# Patient Record
Sex: FEMALE | Race: WHITE | NOT HISPANIC OR LATINO | URBAN - METROPOLITAN AREA
[De-identification: names, ages, dates, MRNs, and addresses within clinical notes are randomized per-mention and may not be internally consistent; named-entity substitution may affect disease eponyms.]

---

## 2017-01-01 ENCOUNTER — INPATIENT (INPATIENT)
Facility: HOSPITAL | Age: 82
LOS: 17 days | DRG: 435 | End: 2017-09-19
Attending: INTERNAL MEDICINE | Admitting: INTERNAL MEDICINE
Payer: COMMERCIAL

## 2017-01-01 VITALS — OXYGEN SATURATION: 89 % | HEART RATE: 83 BPM | RESPIRATION RATE: 16 BRPM | TEMPERATURE: 99 F

## 2017-01-01 VITALS — SYSTOLIC BLOOD PRESSURE: 120 MMHG | RESPIRATION RATE: 18 BRPM | DIASTOLIC BLOOD PRESSURE: 72 MMHG | HEART RATE: 95 BPM

## 2017-01-01 DIAGNOSIS — K86.9 DISEASE OF PANCREAS, UNSPECIFIED: ICD-10-CM

## 2017-01-01 DIAGNOSIS — R53.83 OTHER FATIGUE: ICD-10-CM

## 2017-01-01 DIAGNOSIS — K83.1 OBSTRUCTION OF BILE DUCT: ICD-10-CM

## 2017-01-01 DIAGNOSIS — K80.50 CALCULUS OF BILE DUCT WITHOUT CHOLANGITIS OR CHOLECYSTITIS WITHOUT OBSTRUCTION: ICD-10-CM

## 2017-01-01 DIAGNOSIS — R45.1 RESTLESSNESS AND AGITATION: ICD-10-CM

## 2017-01-01 DIAGNOSIS — I10 ESSENTIAL (PRIMARY) HYPERTENSION: ICD-10-CM

## 2017-01-01 DIAGNOSIS — Z29.9 ENCOUNTER FOR PROPHYLACTIC MEASURES, UNSPECIFIED: ICD-10-CM

## 2017-01-01 DIAGNOSIS — Z71.89 OTHER SPECIFIED COUNSELING: ICD-10-CM

## 2017-01-01 DIAGNOSIS — Z51.5 ENCOUNTER FOR PALLIATIVE CARE: ICD-10-CM

## 2017-01-01 DIAGNOSIS — R52 PAIN, UNSPECIFIED: ICD-10-CM

## 2017-01-01 DIAGNOSIS — J44.9 CHRONIC OBSTRUCTIVE PULMONARY DISEASE, UNSPECIFIED: ICD-10-CM

## 2017-01-01 DIAGNOSIS — E03.9 HYPOTHYROIDISM, UNSPECIFIED: ICD-10-CM

## 2017-01-01 DIAGNOSIS — M79.669 PAIN IN UNSPECIFIED LOWER LEG: ICD-10-CM

## 2017-01-01 DIAGNOSIS — R53.81 OTHER MALAISE: ICD-10-CM

## 2017-01-01 DIAGNOSIS — N83.9 NONINFLAMMATORY DISORDER OF OVARY, FALLOPIAN TUBE AND BROAD LIGAMENT, UNSPECIFIED: ICD-10-CM

## 2017-01-01 DIAGNOSIS — Z66 DO NOT RESUSCITATE: ICD-10-CM

## 2017-01-01 DIAGNOSIS — C24.1 MALIGNANT NEOPLASM OF AMPULLA OF VATER: ICD-10-CM

## 2017-01-01 DIAGNOSIS — F31.9 BIPOLAR DISORDER, UNSPECIFIED: ICD-10-CM

## 2017-01-01 DIAGNOSIS — N32.89 OTHER SPECIFIED DISORDERS OF BLADDER: ICD-10-CM

## 2017-01-01 LAB
ALBUMIN SERPL ELPH-MCNC: 2.1 G/DL — LOW (ref 3.3–5)
ALBUMIN SERPL ELPH-MCNC: 2.3 G/DL — LOW (ref 3.3–5)
ALBUMIN SERPL ELPH-MCNC: 2.7 G/DL — LOW (ref 3.3–5)
ALBUMIN SERPL ELPH-MCNC: 3 G/DL — LOW (ref 3.3–5)
ALBUMIN SERPL ELPH-MCNC: 3.1 G/DL — LOW (ref 3.3–5)
ALP SERPL-CCNC: 588 U/L — HIGH (ref 40–120)
ALP SERPL-CCNC: 621 U/L — HIGH (ref 40–120)
ALP SERPL-CCNC: 626 U/L — HIGH (ref 40–120)
ALP SERPL-CCNC: 705 U/L — HIGH (ref 40–120)
ALP SERPL-CCNC: 830 U/L — HIGH (ref 40–120)
ALT FLD-CCNC: 143 U/L RC — HIGH (ref 10–45)
ALT FLD-CCNC: 149 U/L RC — HIGH (ref 10–45)
ALT FLD-CCNC: 177 U/L RC — HIGH (ref 10–45)
ALT FLD-CCNC: 239 U/L RC — HIGH (ref 10–45)
ALT FLD-CCNC: 250 U/L RC — HIGH (ref 10–45)
ANION GAP SERPL CALC-SCNC: 12 MMOL/L — SIGNIFICANT CHANGE UP (ref 5–17)
ANION GAP SERPL CALC-SCNC: 12 MMOL/L — SIGNIFICANT CHANGE UP (ref 5–17)
ANION GAP SERPL CALC-SCNC: 7 MMOL/L — SIGNIFICANT CHANGE UP (ref 5–17)
ANION GAP SERPL CALC-SCNC: 8 MMOL/L — SIGNIFICANT CHANGE UP (ref 5–17)
ANION GAP SERPL CALC-SCNC: 9 MMOL/L — SIGNIFICANT CHANGE UP (ref 5–17)
APPEARANCE UR: ABNORMAL
APPEARANCE UR: ABNORMAL
APTT BLD: 30.8 SEC — SIGNIFICANT CHANGE UP (ref 27.5–37.4)
AST SERPL-CCNC: 169 U/L — HIGH (ref 10–40)
AST SERPL-CCNC: 179 U/L — HIGH (ref 10–40)
AST SERPL-CCNC: 179 U/L — HIGH (ref 10–40)
AST SERPL-CCNC: 291 U/L — HIGH (ref 10–40)
AST SERPL-CCNC: 334 U/L — HIGH (ref 10–40)
BASE EXCESS BLDV CALC-SCNC: 9.9 MMOL/L — HIGH (ref -2–2)
BASOPHILS # BLD AUTO: 0 K/UL — SIGNIFICANT CHANGE UP (ref 0–0.2)
BASOPHILS # BLD AUTO: 0 K/UL — SIGNIFICANT CHANGE UP (ref 0–0.2)
BASOPHILS NFR BLD AUTO: 0.3 % — SIGNIFICANT CHANGE UP (ref 0–2)
BILIRUB DIRECT SERPL-MCNC: 12.3 MG/DL — HIGH (ref 0–0.2)
BILIRUB DIRECT SERPL-MCNC: 12.5 MG/DL — HIGH (ref 0–0.2)
BILIRUB INDIRECT FLD-MCNC: 2.2 MG/DL — HIGH (ref 0.2–1)
BILIRUB SERPL-MCNC: 13.1 MG/DL — HIGH (ref 0.2–1.2)
BILIRUB SERPL-MCNC: 13.5 MG/DL — HIGH (ref 0.2–1.2)
BILIRUB SERPL-MCNC: 14.5 MG/DL — HIGH (ref 0.2–1.2)
BILIRUB SERPL-MCNC: 14.5 MG/DL — HIGH (ref 0.2–1.2)
BILIRUB SERPL-MCNC: 15.2 MG/DL — HIGH (ref 0.2–1.2)
BILIRUB SERPL-MCNC: 16.4 MG/DL — HIGH (ref 0.2–1.2)
BILIRUB UR-MCNC: ABNORMAL
BILIRUB UR-MCNC: ABNORMAL
BLD GP AB SCN SERPL QL: NEGATIVE — SIGNIFICANT CHANGE UP
BUN SERPL-MCNC: 13 MG/DL — SIGNIFICANT CHANGE UP (ref 7–23)
BUN SERPL-MCNC: 14 MG/DL — SIGNIFICANT CHANGE UP (ref 7–23)
BUN SERPL-MCNC: 15 MG/DL — SIGNIFICANT CHANGE UP (ref 7–23)
BUN SERPL-MCNC: 19 MG/DL — SIGNIFICANT CHANGE UP (ref 7–23)
BUN SERPL-MCNC: 19 MG/DL — SIGNIFICANT CHANGE UP (ref 7–23)
CA-I SERPL-SCNC: 1.19 MMOL/L — SIGNIFICANT CHANGE UP (ref 1.12–1.3)
CALCIUM SERPL-MCNC: 10 MG/DL — SIGNIFICANT CHANGE UP (ref 8.4–10.5)
CALCIUM SERPL-MCNC: 9 MG/DL — SIGNIFICANT CHANGE UP (ref 8.4–10.5)
CALCIUM SERPL-MCNC: 9.4 MG/DL — SIGNIFICANT CHANGE UP (ref 8.4–10.5)
CALCIUM SERPL-MCNC: 9.5 MG/DL — SIGNIFICANT CHANGE UP (ref 8.4–10.5)
CALCIUM SERPL-MCNC: 9.7 MG/DL — SIGNIFICANT CHANGE UP (ref 8.4–10.5)
CHLORIDE BLDV-SCNC: 97 MMOL/L — SIGNIFICANT CHANGE UP (ref 96–108)
CHLORIDE SERPL-SCNC: 101 MMOL/L — SIGNIFICANT CHANGE UP (ref 96–108)
CHLORIDE SERPL-SCNC: 94 MMOL/L — LOW (ref 96–108)
CHLORIDE SERPL-SCNC: 95 MMOL/L — LOW (ref 96–108)
CHLORIDE SERPL-SCNC: 98 MMOL/L — SIGNIFICANT CHANGE UP (ref 96–108)
CHLORIDE SERPL-SCNC: 99 MMOL/L — SIGNIFICANT CHANGE UP (ref 96–108)
CO2 BLDV-SCNC: 38 MMOL/L — HIGH (ref 22–30)
CO2 SERPL-SCNC: 31 MMOL/L — SIGNIFICANT CHANGE UP (ref 22–31)
CO2 SERPL-SCNC: 32 MMOL/L — HIGH (ref 22–31)
CO2 SERPL-SCNC: 35 MMOL/L — HIGH (ref 22–31)
COLOR SPEC: ABNORMAL
COLOR SPEC: ABNORMAL
COMMENT - URINE: SIGNIFICANT CHANGE UP
CREAT SERPL-MCNC: 0.64 MG/DL — SIGNIFICANT CHANGE UP (ref 0.5–1.3)
CREAT SERPL-MCNC: 0.91 MG/DL — SIGNIFICANT CHANGE UP (ref 0.5–1.3)
CREAT SERPL-MCNC: 0.91 MG/DL — SIGNIFICANT CHANGE UP (ref 0.5–1.3)
CREAT SERPL-MCNC: 0.93 MG/DL — SIGNIFICANT CHANGE UP (ref 0.5–1.3)
CREAT SERPL-MCNC: 0.98 MG/DL — SIGNIFICANT CHANGE UP (ref 0.5–1.3)
CULTURE RESULTS: NO GROWTH — SIGNIFICANT CHANGE UP
CULTURE RESULTS: SIGNIFICANT CHANGE UP
CULTURE RESULTS: SIGNIFICANT CHANGE UP
DIFF PNL FLD: ABNORMAL
DIFF PNL FLD: ABNORMAL
EOSINOPHIL # BLD AUTO: 0.1 K/UL — SIGNIFICANT CHANGE UP (ref 0–0.5)
EOSINOPHIL # BLD AUTO: 0.2 K/UL — SIGNIFICANT CHANGE UP (ref 0–0.5)
EOSINOPHIL NFR BLD AUTO: 1 % — SIGNIFICANT CHANGE UP (ref 0–6)
GAS PNL BLDV: 136 MMOL/L — SIGNIFICANT CHANGE UP (ref 136–145)
GAS PNL BLDV: SIGNIFICANT CHANGE UP
GAS PNL BLDV: SIGNIFICANT CHANGE UP
GLUCOSE BLDV-MCNC: 111 MG/DL — HIGH (ref 70–99)
GLUCOSE SERPL-MCNC: 102 MG/DL — HIGH (ref 70–99)
GLUCOSE SERPL-MCNC: 106 MG/DL — HIGH (ref 70–99)
GLUCOSE SERPL-MCNC: 115 MG/DL — HIGH (ref 70–99)
GLUCOSE SERPL-MCNC: 115 MG/DL — HIGH (ref 70–99)
GLUCOSE SERPL-MCNC: 92 MG/DL — SIGNIFICANT CHANGE UP (ref 70–99)
GLUCOSE UR QL: NEGATIVE — SIGNIFICANT CHANGE UP
GLUCOSE UR QL: NEGATIVE — SIGNIFICANT CHANGE UP
HCO3 BLDV-SCNC: 36 MMOL/L — HIGH (ref 21–29)
HCT VFR BLD CALC: 25.9 % — LOW (ref 34.5–45)
HCT VFR BLD CALC: 28.9 % — LOW (ref 34.5–45)
HCT VFR BLD CALC: 29.8 % — LOW (ref 34.5–45)
HCT VFR BLD CALC: 33 % — LOW (ref 34.5–45)
HCT VFR BLD CALC: 33.1 % — LOW (ref 34.5–45)
HCT VFR BLDA CALC: 33 % — LOW (ref 39–50)
HGB BLD CALC-MCNC: 10.8 G/DL — LOW (ref 11.5–15.5)
HGB BLD-MCNC: 10.4 G/DL — LOW (ref 11.5–15.5)
HGB BLD-MCNC: 10.8 G/DL — LOW (ref 11.5–15.5)
HGB BLD-MCNC: 8.5 G/DL — LOW (ref 11.5–15.5)
HGB BLD-MCNC: 9.6 G/DL — LOW (ref 11.5–15.5)
HGB BLD-MCNC: 9.7 G/DL — LOW (ref 11.5–15.5)
HYPOCHROMIA BLD QL: SLIGHT — SIGNIFICANT CHANGE UP
INR BLD: 1.09 RATIO — SIGNIFICANT CHANGE UP (ref 0.88–1.16)
KETONES UR-MCNC: ABNORMAL
KETONES UR-MCNC: NEGATIVE — SIGNIFICANT CHANGE UP
LACTATE BLDV-MCNC: 2 MMOL/L — SIGNIFICANT CHANGE UP (ref 0.7–2)
LEUKOCYTE ESTERASE UR-ACNC: ABNORMAL
LEUKOCYTE ESTERASE UR-ACNC: NEGATIVE — SIGNIFICANT CHANGE UP
LIDOCAIN IGE QN: 23 U/L — SIGNIFICANT CHANGE UP (ref 7–60)
LYMPHOCYTES # BLD AUTO: 1.1 K/UL — SIGNIFICANT CHANGE UP (ref 1–3.3)
LYMPHOCYTES # BLD AUTO: 2 K/UL — SIGNIFICANT CHANGE UP (ref 1–3.3)
LYMPHOCYTES # BLD AUTO: 22 % — SIGNIFICANT CHANGE UP (ref 13–44)
LYMPHOCYTES # BLD AUTO: 8.4 % — LOW (ref 13–44)
MAGNESIUM SERPL-MCNC: 2.2 MG/DL — SIGNIFICANT CHANGE UP (ref 1.6–2.6)
MAGNESIUM SERPL-MCNC: 2.2 MG/DL — SIGNIFICANT CHANGE UP (ref 1.6–2.6)
MAGNESIUM SERPL-MCNC: 2.3 MG/DL — SIGNIFICANT CHANGE UP (ref 1.6–2.6)
MAGNESIUM SERPL-MCNC: 2.4 MG/DL — SIGNIFICANT CHANGE UP (ref 1.6–2.6)
MCHC RBC-ENTMCNC: 30.7 PG — SIGNIFICANT CHANGE UP (ref 27–34)
MCHC RBC-ENTMCNC: 31.5 GM/DL — LOW (ref 32–36)
MCHC RBC-ENTMCNC: 31.7 PG — SIGNIFICANT CHANGE UP (ref 27–34)
MCHC RBC-ENTMCNC: 31.8 PG — SIGNIFICANT CHANGE UP (ref 27–34)
MCHC RBC-ENTMCNC: 32.1 PG — SIGNIFICANT CHANGE UP (ref 27–34)
MCHC RBC-ENTMCNC: 32.2 PG — SIGNIFICANT CHANGE UP (ref 27–34)
MCHC RBC-ENTMCNC: 32.4 GM/DL — SIGNIFICANT CHANGE UP (ref 32–36)
MCHC RBC-ENTMCNC: 32.8 GM/DL — SIGNIFICANT CHANGE UP (ref 32–36)
MCHC RBC-ENTMCNC: 32.8 GM/DL — SIGNIFICANT CHANGE UP (ref 32–36)
MCHC RBC-ENTMCNC: 33.1 GM/DL — SIGNIFICANT CHANGE UP (ref 32–36)
MCV RBC AUTO: 97.1 FL — SIGNIFICANT CHANGE UP (ref 80–100)
MCV RBC AUTO: 97.5 FL — SIGNIFICANT CHANGE UP (ref 80–100)
MCV RBC AUTO: 97.6 FL — SIGNIFICANT CHANGE UP (ref 80–100)
MCV RBC AUTO: 97.8 FL — SIGNIFICANT CHANGE UP (ref 80–100)
MCV RBC AUTO: 97.9 FL — SIGNIFICANT CHANGE UP (ref 80–100)
MONOCYTES # BLD AUTO: 1.2 K/UL — HIGH (ref 0–0.9)
MONOCYTES # BLD AUTO: 1.4 K/UL — HIGH (ref 0–0.9)
MONOCYTES NFR BLD AUTO: 11 % — SIGNIFICANT CHANGE UP (ref 2–14)
MONOCYTES NFR BLD AUTO: 3 % — SIGNIFICANT CHANGE UP (ref 2–14)
NEUTROPHILS # BLD AUTO: 10.2 K/UL — HIGH (ref 1.8–7.4)
NEUTROPHILS # BLD AUTO: 8.3 K/UL — HIGH (ref 1.8–7.4)
NEUTROPHILS NFR BLD AUTO: 75 % — SIGNIFICANT CHANGE UP (ref 43–77)
NEUTROPHILS NFR BLD AUTO: 79.3 % — HIGH (ref 43–77)
NITRITE UR-MCNC: NEGATIVE — SIGNIFICANT CHANGE UP
NITRITE UR-MCNC: NEGATIVE — SIGNIFICANT CHANGE UP
PCO2 BLDV: 60 MMHG — HIGH (ref 35–50)
PH BLDV: 7.4 — SIGNIFICANT CHANGE UP (ref 7.35–7.45)
PH UR: 6 — SIGNIFICANT CHANGE UP (ref 5–8)
PH UR: 6 — SIGNIFICANT CHANGE UP (ref 5–8)
PHOSPHATE SERPL-MCNC: 2.1 MG/DL — LOW (ref 2.5–4.5)
PHOSPHATE SERPL-MCNC: 2.6 MG/DL — SIGNIFICANT CHANGE UP (ref 2.5–4.5)
PHOSPHATE SERPL-MCNC: 2.9 MG/DL — SIGNIFICANT CHANGE UP (ref 2.5–4.5)
PHOSPHATE SERPL-MCNC: 3 MG/DL — SIGNIFICANT CHANGE UP (ref 2.5–4.5)
PLAT MORPH BLD: NORMAL — SIGNIFICANT CHANGE UP
PLATELET # BLD AUTO: 222 K/UL — SIGNIFICANT CHANGE UP (ref 150–400)
PLATELET # BLD AUTO: 226 K/UL — SIGNIFICANT CHANGE UP (ref 150–400)
PLATELET # BLD AUTO: 233 K/UL — SIGNIFICANT CHANGE UP (ref 150–400)
PLATELET # BLD AUTO: 244 K/UL — SIGNIFICANT CHANGE UP (ref 150–400)
PLATELET # BLD AUTO: 277 K/UL — SIGNIFICANT CHANGE UP (ref 150–400)
PO2 BLDV: 38 MMHG — SIGNIFICANT CHANGE UP (ref 25–45)
POLYCHROMASIA BLD QL SMEAR: SLIGHT — SIGNIFICANT CHANGE UP
POTASSIUM BLDV-SCNC: 4 MMOL/L — SIGNIFICANT CHANGE UP (ref 3.5–5)
POTASSIUM SERPL-MCNC: 3.4 MMOL/L — LOW (ref 3.5–5.3)
POTASSIUM SERPL-MCNC: 3.8 MMOL/L — SIGNIFICANT CHANGE UP (ref 3.5–5.3)
POTASSIUM SERPL-MCNC: 4 MMOL/L — SIGNIFICANT CHANGE UP (ref 3.5–5.3)
POTASSIUM SERPL-MCNC: 4.2 MMOL/L — SIGNIFICANT CHANGE UP (ref 3.5–5.3)
POTASSIUM SERPL-MCNC: 4.2 MMOL/L — SIGNIFICANT CHANGE UP (ref 3.5–5.3)
POTASSIUM SERPL-SCNC: 3.4 MMOL/L — LOW (ref 3.5–5.3)
POTASSIUM SERPL-SCNC: 3.8 MMOL/L — SIGNIFICANT CHANGE UP (ref 3.5–5.3)
POTASSIUM SERPL-SCNC: 4 MMOL/L — SIGNIFICANT CHANGE UP (ref 3.5–5.3)
POTASSIUM SERPL-SCNC: 4.2 MMOL/L — SIGNIFICANT CHANGE UP (ref 3.5–5.3)
POTASSIUM SERPL-SCNC: 4.2 MMOL/L — SIGNIFICANT CHANGE UP (ref 3.5–5.3)
PROT SERPL-MCNC: 4.7 G/DL — LOW (ref 6–8.3)
PROT SERPL-MCNC: 5.1 G/DL — LOW (ref 6–8.3)
PROT SERPL-MCNC: 5.5 G/DL — LOW (ref 6–8.3)
PROT SERPL-MCNC: 6.2 G/DL — SIGNIFICANT CHANGE UP (ref 6–8.3)
PROT SERPL-MCNC: 6.3 G/DL — SIGNIFICANT CHANGE UP (ref 6–8.3)
PROT UR-MCNC: 30 MG/DL
PROT UR-MCNC: SIGNIFICANT CHANGE UP
PROTHROM AB SERPL-ACNC: 11.8 SEC — SIGNIFICANT CHANGE UP (ref 9.8–12.7)
RBC # BLD: 2.64 M/UL — LOW (ref 3.8–5.2)
RBC # BLD: 2.97 M/UL — LOW (ref 3.8–5.2)
RBC # BLD: 3.05 M/UL — LOW (ref 3.8–5.2)
RBC # BLD: 3.39 M/UL — LOW (ref 3.8–5.2)
RBC # BLD: 3.39 M/UL — LOW (ref 3.8–5.2)
RBC # FLD: 14.8 % — HIGH (ref 10.3–14.5)
RBC # FLD: 14.9 % — HIGH (ref 10.3–14.5)
RBC # FLD: 14.9 % — HIGH (ref 10.3–14.5)
RBC # FLD: 15.1 % — HIGH (ref 10.3–14.5)
RBC # FLD: 15.1 % — HIGH (ref 10.3–14.5)
RBC BLD AUTO: ABNORMAL
RBC CASTS # UR COMP ASSIST: ABNORMAL /HPF (ref 0–2)
RH IG SCN BLD-IMP: POSITIVE — SIGNIFICANT CHANGE UP
SAO2 % BLDV: 68 % — SIGNIFICANT CHANGE UP (ref 67–88)
SODIUM SERPL-SCNC: 137 MMOL/L — SIGNIFICANT CHANGE UP (ref 135–145)
SODIUM SERPL-SCNC: 139 MMOL/L — SIGNIFICANT CHANGE UP (ref 135–145)
SODIUM SERPL-SCNC: 140 MMOL/L — SIGNIFICANT CHANGE UP (ref 135–145)
SODIUM SERPL-SCNC: 140 MMOL/L — SIGNIFICANT CHANGE UP (ref 135–145)
SODIUM SERPL-SCNC: 141 MMOL/L — SIGNIFICANT CHANGE UP (ref 135–145)
SP GR SPEC: 1.02 — SIGNIFICANT CHANGE UP (ref 1.01–1.02)
SP GR SPEC: >1.03 — HIGH (ref 1.01–1.02)
SPECIMEN SOURCE: SIGNIFICANT CHANGE UP
TARGETS BLD QL SMEAR: SIGNIFICANT CHANGE UP
TSH SERPL-MCNC: 2.49 UIU/ML — SIGNIFICANT CHANGE UP (ref 0.27–4.2)
UROBILINOGEN FLD QL: 1
UROBILINOGEN FLD QL: 1
VALPROATE SERPL-MCNC: 46 UG/ML — LOW (ref 50–100)
WBC # BLD: 11.7 K/UL — HIGH (ref 3.8–10.5)
WBC # BLD: 12.8 K/UL — HIGH (ref 3.8–10.5)
WBC # BLD: 6.3 K/UL — SIGNIFICANT CHANGE UP (ref 3.8–10.5)
WBC # BLD: 7.6 K/UL — SIGNIFICANT CHANGE UP (ref 3.8–10.5)
WBC # BLD: 8.6 K/UL — SIGNIFICANT CHANGE UP (ref 3.8–10.5)
WBC # FLD AUTO: 11.7 K/UL — HIGH (ref 3.8–10.5)
WBC # FLD AUTO: 12.8 K/UL — HIGH (ref 3.8–10.5)
WBC # FLD AUTO: 6.3 K/UL — SIGNIFICANT CHANGE UP (ref 3.8–10.5)
WBC # FLD AUTO: 7.6 K/UL — SIGNIFICANT CHANGE UP (ref 3.8–10.5)
WBC # FLD AUTO: 8.6 K/UL — SIGNIFICANT CHANGE UP (ref 3.8–10.5)
WBC UR QL: >50 /HPF (ref 0–5)

## 2017-01-01 PROCEDURE — 99233 SBSQ HOSP IP/OBS HIGH 50: CPT | Mod: GC

## 2017-01-01 PROCEDURE — 74177 CT ABD & PELVIS W/CONTRAST: CPT | Mod: 26

## 2017-01-01 PROCEDURE — 99284 EMERGENCY DEPT VISIT MOD MDM: CPT

## 2017-01-01 PROCEDURE — 99232 SBSQ HOSP IP/OBS MODERATE 35: CPT

## 2017-01-01 PROCEDURE — 99223 1ST HOSP IP/OBS HIGH 75: CPT | Mod: AI,GC

## 2017-01-01 PROCEDURE — 99223 1ST HOSP IP/OBS HIGH 75: CPT | Mod: GC

## 2017-01-01 PROCEDURE — 74182 MRI ABDOMEN W/CONTRAST: CPT | Mod: 26

## 2017-01-01 PROCEDURE — 93970 EXTREMITY STUDY: CPT | Mod: 26

## 2017-01-01 PROCEDURE — 93010 ELECTROCARDIOGRAM REPORT: CPT

## 2017-01-01 PROCEDURE — 70450 CT HEAD/BRAIN W/O DYE: CPT | Mod: 26

## 2017-01-01 PROCEDURE — 99222 1ST HOSP IP/OBS MODERATE 55: CPT | Mod: GC

## 2017-01-01 PROCEDURE — 12345: CPT | Mod: GC,NC

## 2017-01-01 RX ORDER — SODIUM CHLORIDE 9 MG/ML
1000 INJECTION INTRAMUSCULAR; INTRAVENOUS; SUBCUTANEOUS
Qty: 0 | Refills: 0 | Status: DISCONTINUED | OUTPATIENT
Start: 2017-01-01 | End: 2017-01-01

## 2017-01-01 RX ORDER — MEROPENEM 1 G/30ML
1000 INJECTION INTRAVENOUS ONCE
Qty: 0 | Refills: 0 | Status: COMPLETED | OUTPATIENT
Start: 2017-01-01 | End: 2017-01-01

## 2017-01-01 RX ORDER — CEFEPIME 1 G/1
1000 INJECTION, POWDER, FOR SOLUTION INTRAMUSCULAR; INTRAVENOUS EVERY 8 HOURS
Qty: 0 | Refills: 0 | Status: DISCONTINUED | OUTPATIENT
Start: 2017-01-01 | End: 2017-01-01

## 2017-01-01 RX ORDER — HYDROMORPHONE HYDROCHLORIDE 2 MG/ML
4 INJECTION INTRAMUSCULAR; INTRAVENOUS; SUBCUTANEOUS
Qty: 100 | Refills: 0 | Status: DISCONTINUED | OUTPATIENT
Start: 2017-01-01 | End: 2017-01-01

## 2017-01-01 RX ORDER — VANCOMYCIN HCL 1 G
1000 VIAL (EA) INTRAVENOUS ONCE
Qty: 0 | Refills: 0 | Status: COMPLETED | OUTPATIENT
Start: 2017-01-01 | End: 2017-01-01

## 2017-01-01 RX ORDER — FUROSEMIDE 40 MG
20 TABLET ORAL DAILY
Qty: 0 | Refills: 0 | Status: DISCONTINUED | OUTPATIENT
Start: 2017-01-01 | End: 2017-01-01

## 2017-01-01 RX ORDER — MEROPENEM 1 G/30ML
1000 INJECTION INTRAVENOUS EVERY 8 HOURS
Qty: 0 | Refills: 0 | Status: DISCONTINUED | OUTPATIENT
Start: 2017-01-01 | End: 2017-01-01

## 2017-01-01 RX ORDER — SODIUM CHLORIDE 9 MG/ML
1000 INJECTION, SOLUTION INTRAVENOUS
Qty: 0 | Refills: 0 | Status: DISCONTINUED | OUTPATIENT
Start: 2017-01-01 | End: 2017-01-01

## 2017-01-01 RX ORDER — HYDROMORPHONE HYDROCHLORIDE 2 MG/ML
8 INJECTION INTRAMUSCULAR; INTRAVENOUS; SUBCUTANEOUS
Qty: 0 | Refills: 0 | Status: DISCONTINUED | OUTPATIENT
Start: 2017-01-01 | End: 2017-01-01

## 2017-01-01 RX ORDER — MORPHINE SULFATE 50 MG/1
2 CAPSULE, EXTENDED RELEASE ORAL EVERY 4 HOURS
Qty: 0 | Refills: 0 | Status: DISCONTINUED | OUTPATIENT
Start: 2017-01-01 | End: 2017-01-01

## 2017-01-01 RX ORDER — QUETIAPINE FUMARATE 200 MG/1
25 TABLET, FILM COATED ORAL DAILY
Qty: 0 | Refills: 0 | Status: DISCONTINUED | OUTPATIENT
Start: 2017-01-01 | End: 2017-01-01

## 2017-01-01 RX ORDER — NYSTATIN CREAM 100000 [USP'U]/G
1 CREAM TOPICAL
Qty: 0 | Refills: 0 | Status: DISCONTINUED | OUTPATIENT
Start: 2017-01-01 | End: 2017-01-01

## 2017-01-01 RX ORDER — QUETIAPINE FUMARATE 200 MG/1
1 TABLET, FILM COATED ORAL
Qty: 0 | Refills: 0 | COMMUNITY

## 2017-01-01 RX ORDER — ENOXAPARIN SODIUM 100 MG/ML
40 INJECTION SUBCUTANEOUS DAILY
Qty: 0 | Refills: 0 | Status: DISCONTINUED | OUTPATIENT
Start: 2017-01-01 | End: 2017-01-01

## 2017-01-01 RX ORDER — DIVALPROEX SODIUM 500 MG/1
1 TABLET, DELAYED RELEASE ORAL
Qty: 0 | Refills: 0 | COMMUNITY

## 2017-01-01 RX ORDER — LEVOTHYROXINE SODIUM 125 MCG
50 TABLET ORAL DAILY
Qty: 0 | Refills: 0 | Status: DISCONTINUED | OUTPATIENT
Start: 2017-01-01 | End: 2017-01-01

## 2017-01-01 RX ORDER — HYDROMORPHONE HYDROCHLORIDE 2 MG/ML
0.5 INJECTION INTRAMUSCULAR; INTRAVENOUS; SUBCUTANEOUS
Qty: 100 | Refills: 0 | Status: DISCONTINUED | OUTPATIENT
Start: 2017-01-01 | End: 2017-01-01

## 2017-01-01 RX ORDER — METRONIDAZOLE 500 MG
500 TABLET ORAL EVERY 8 HOURS
Qty: 0 | Refills: 0 | Status: DISCONTINUED | OUTPATIENT
Start: 2017-01-01 | End: 2017-01-01

## 2017-01-01 RX ORDER — DIVALPROEX SODIUM 500 MG/1
250 TABLET, DELAYED RELEASE ORAL AT BEDTIME
Qty: 0 | Refills: 0 | Status: DISCONTINUED | OUTPATIENT
Start: 2017-01-01 | End: 2017-01-01

## 2017-01-01 RX ORDER — HYDROMORPHONE HYDROCHLORIDE 2 MG/ML
4 INJECTION INTRAMUSCULAR; INTRAVENOUS; SUBCUTANEOUS
Qty: 0 | Refills: 0 | Status: DISCONTINUED | OUTPATIENT
Start: 2017-01-01 | End: 2017-01-01

## 2017-01-01 RX ORDER — HYDROMORPHONE HYDROCHLORIDE 2 MG/ML
1 INJECTION INTRAMUSCULAR; INTRAVENOUS; SUBCUTANEOUS
Qty: 100 | Refills: 0 | Status: DISCONTINUED | OUTPATIENT
Start: 2017-01-01 | End: 2017-01-01

## 2017-01-01 RX ORDER — HYDROMORPHONE HYDROCHLORIDE 2 MG/ML
0.5 INJECTION INTRAMUSCULAR; INTRAVENOUS; SUBCUTANEOUS
Qty: 0 | Refills: 0 | Status: DISCONTINUED | OUTPATIENT
Start: 2017-01-01 | End: 2017-01-01

## 2017-01-01 RX ORDER — AZTREONAM 2 G
VIAL (EA) INJECTION
Qty: 0 | Refills: 0 | Status: DISCONTINUED | OUTPATIENT
Start: 2017-01-01 | End: 2017-01-01

## 2017-01-01 RX ORDER — CEFEPIME 1 G/1
1000 INJECTION, POWDER, FOR SOLUTION INTRAMUSCULAR; INTRAVENOUS ONCE
Qty: 0 | Refills: 0 | Status: COMPLETED | OUTPATIENT
Start: 2017-01-01 | End: 2017-01-01

## 2017-01-01 RX ORDER — MEROPENEM 1 G/30ML
INJECTION INTRAVENOUS
Qty: 0 | Refills: 0 | Status: DISCONTINUED | OUTPATIENT
Start: 2017-01-01 | End: 2017-01-01

## 2017-01-01 RX ORDER — HYDROMORPHONE HYDROCHLORIDE 2 MG/ML
1.5 INJECTION INTRAMUSCULAR; INTRAVENOUS; SUBCUTANEOUS
Qty: 0 | Refills: 0 | Status: DISCONTINUED | OUTPATIENT
Start: 2017-01-01 | End: 2017-01-01

## 2017-01-01 RX ORDER — MORPHINE SULFATE 50 MG/1
2 CAPSULE, EXTENDED RELEASE ORAL ONCE
Qty: 0 | Refills: 0 | Status: DISCONTINUED | OUTPATIENT
Start: 2017-01-01 | End: 2017-01-01

## 2017-01-01 RX ORDER — HYDROMORPHONE HYDROCHLORIDE 2 MG/ML
2 INJECTION INTRAMUSCULAR; INTRAVENOUS; SUBCUTANEOUS
Qty: 0 | Refills: 0 | Status: DISCONTINUED | OUTPATIENT
Start: 2017-01-01 | End: 2017-01-01

## 2017-01-01 RX ORDER — DOCUSATE SODIUM 100 MG
100 CAPSULE ORAL DAILY
Qty: 0 | Refills: 0 | Status: DISCONTINUED | OUTPATIENT
Start: 2017-01-01 | End: 2017-01-01

## 2017-01-01 RX ORDER — HYDROMORPHONE HYDROCHLORIDE 2 MG/ML
1 INJECTION INTRAMUSCULAR; INTRAVENOUS; SUBCUTANEOUS
Qty: 0 | Refills: 0 | Status: DISCONTINUED | OUTPATIENT
Start: 2017-01-01 | End: 2017-01-01

## 2017-01-01 RX ORDER — ROBINUL 0.2 MG/ML
0.4 INJECTION INTRAMUSCULAR; INTRAVENOUS EVERY 6 HOURS
Qty: 0 | Refills: 0 | Status: DISCONTINUED | OUTPATIENT
Start: 2017-01-01 | End: 2017-01-01

## 2017-01-01 RX ORDER — AZTREONAM 2 G
1000 VIAL (EA) INJECTION ONCE
Qty: 0 | Refills: 0 | Status: COMPLETED | OUTPATIENT
Start: 2017-01-01 | End: 2017-01-01

## 2017-01-01 RX ORDER — NYSTATIN CREAM 100000 [USP'U]/G
1 CREAM TOPICAL
Qty: 0 | Refills: 0 | COMMUNITY

## 2017-01-01 RX ORDER — ACETAMINOPHEN 500 MG
650 TABLET ORAL EVERY 6 HOURS
Qty: 0 | Refills: 0 | Status: DISCONTINUED | OUTPATIENT
Start: 2017-01-01 | End: 2017-01-01

## 2017-01-01 RX ORDER — HYDROMORPHONE HYDROCHLORIDE 2 MG/ML
0.5 INJECTION INTRAMUSCULAR; INTRAVENOUS; SUBCUTANEOUS EVERY 4 HOURS
Qty: 0 | Refills: 0 | Status: DISCONTINUED | OUTPATIENT
Start: 2017-01-01 | End: 2017-01-01

## 2017-01-01 RX ORDER — QUETIAPINE FUMARATE 200 MG/1
50 TABLET, FILM COATED ORAL AT BEDTIME
Qty: 0 | Refills: 0 | Status: DISCONTINUED | OUTPATIENT
Start: 2017-01-01 | End: 2017-01-01

## 2017-01-01 RX ORDER — FUROSEMIDE 40 MG
1 TABLET ORAL
Qty: 0 | Refills: 0 | COMMUNITY

## 2017-01-01 RX ORDER — DILTIAZEM HCL 120 MG
1 CAPSULE, EXT RELEASE 24 HR ORAL
Qty: 0 | Refills: 0 | COMMUNITY

## 2017-01-01 RX ORDER — HYDROMORPHONE HYDROCHLORIDE 2 MG/ML
0.5 INJECTION INTRAMUSCULAR; INTRAVENOUS; SUBCUTANEOUS
Qty: 10 | Refills: 0 | Status: DISCONTINUED | OUTPATIENT
Start: 2017-01-01 | End: 2017-01-01

## 2017-01-01 RX ORDER — HYDROMORPHONE HYDROCHLORIDE 2 MG/ML
1.4 INJECTION INTRAMUSCULAR; INTRAVENOUS; SUBCUTANEOUS
Qty: 0 | Refills: 0 | Status: DISCONTINUED | OUTPATIENT
Start: 2017-01-01 | End: 2017-01-01

## 2017-01-01 RX ORDER — HYDROMORPHONE HYDROCHLORIDE 2 MG/ML
1.5 INJECTION INTRAMUSCULAR; INTRAVENOUS; SUBCUTANEOUS
Qty: 100 | Refills: 0 | Status: DISCONTINUED | OUTPATIENT
Start: 2017-01-01 | End: 2017-01-01

## 2017-01-01 RX ORDER — AZTREONAM 2 G
1000 VIAL (EA) INJECTION EVERY 8 HOURS
Qty: 0 | Refills: 0 | Status: DISCONTINUED | OUTPATIENT
Start: 2017-01-01 | End: 2017-01-01

## 2017-01-01 RX ORDER — MORPHINE SULFATE 50 MG/1
4 CAPSULE, EXTENDED RELEASE ORAL ONCE
Qty: 0 | Refills: 0 | Status: DISCONTINUED | OUTPATIENT
Start: 2017-01-01 | End: 2017-01-01

## 2017-01-01 RX ORDER — HYDROMORPHONE HYDROCHLORIDE 2 MG/ML
0.7 INJECTION INTRAMUSCULAR; INTRAVENOUS; SUBCUTANEOUS
Qty: 100 | Refills: 0 | Status: DISCONTINUED | OUTPATIENT
Start: 2017-01-01 | End: 2017-01-01

## 2017-01-01 RX ORDER — DILTIAZEM HCL 120 MG
120 CAPSULE, EXT RELEASE 24 HR ORAL DAILY
Qty: 0 | Refills: 0 | Status: DISCONTINUED | OUTPATIENT
Start: 2017-01-01 | End: 2017-01-01

## 2017-01-01 RX ORDER — DIVALPROEX SODIUM 500 MG/1
500 TABLET, DELAYED RELEASE ORAL DAILY
Qty: 0 | Refills: 0 | Status: DISCONTINUED | OUTPATIENT
Start: 2017-01-01 | End: 2017-01-01

## 2017-01-01 RX ORDER — CEFEPIME 1 G/1
INJECTION, POWDER, FOR SOLUTION INTRAMUSCULAR; INTRAVENOUS
Qty: 0 | Refills: 0 | Status: DISCONTINUED | OUTPATIENT
Start: 2017-01-01 | End: 2017-01-01

## 2017-01-01 RX ORDER — LEVOTHYROXINE SODIUM 125 MCG
1 TABLET ORAL
Qty: 0 | Refills: 0 | COMMUNITY

## 2017-01-01 RX ADMIN — HYDROMORPHONE HYDROCHLORIDE 4 MG/HR: 2 INJECTION INTRAMUSCULAR; INTRAVENOUS; SUBCUTANEOUS at 06:56

## 2017-01-01 RX ADMIN — SODIUM CHLORIDE 10 MILLILITER(S): 9 INJECTION INTRAMUSCULAR; INTRAVENOUS; SUBCUTANEOUS at 19:58

## 2017-01-01 RX ADMIN — HYDROMORPHONE HYDROCHLORIDE 0.5 MILLIGRAM(S): 2 INJECTION INTRAMUSCULAR; INTRAVENOUS; SUBCUTANEOUS at 04:05

## 2017-01-01 RX ADMIN — QUETIAPINE FUMARATE 25 MILLIGRAM(S): 200 TABLET, FILM COATED ORAL at 13:06

## 2017-01-01 RX ADMIN — NYSTATIN CREAM 1 APPLICATION(S): 100000 CREAM TOPICAL at 06:01

## 2017-01-01 RX ADMIN — SODIUM CHLORIDE 10 MILLILITER(S): 9 INJECTION INTRAMUSCULAR; INTRAVENOUS; SUBCUTANEOUS at 18:50

## 2017-01-01 RX ADMIN — Medication 50 MILLIGRAM(S): at 17:01

## 2017-01-01 RX ADMIN — HYDROMORPHONE HYDROCHLORIDE 0.7 MG/HR: 2 INJECTION INTRAMUSCULAR; INTRAVENOUS; SUBCUTANEOUS at 19:56

## 2017-01-01 RX ADMIN — NYSTATIN CREAM 1 APPLICATION(S): 100000 CREAM TOPICAL at 17:38

## 2017-01-01 RX ADMIN — HYDROMORPHONE HYDROCHLORIDE 0.5 MILLIGRAM(S): 2 INJECTION INTRAMUSCULAR; INTRAVENOUS; SUBCUTANEOUS at 19:51

## 2017-01-01 RX ADMIN — HYDROMORPHONE HYDROCHLORIDE 4 MG/HR: 2 INJECTION INTRAMUSCULAR; INTRAVENOUS; SUBCUTANEOUS at 14:24

## 2017-01-01 RX ADMIN — HYDROMORPHONE HYDROCHLORIDE 1.5 MG/HR: 2 INJECTION INTRAMUSCULAR; INTRAVENOUS; SUBCUTANEOUS at 17:13

## 2017-01-01 RX ADMIN — HYDROMORPHONE HYDROCHLORIDE 4 MG/HR: 2 INJECTION INTRAMUSCULAR; INTRAVENOUS; SUBCUTANEOUS at 07:25

## 2017-01-01 RX ADMIN — NYSTATIN CREAM 1 APPLICATION(S): 100000 CREAM TOPICAL at 17:03

## 2017-01-01 RX ADMIN — CEFEPIME 100 MILLIGRAM(S): 1 INJECTION, POWDER, FOR SOLUTION INTRAMUSCULAR; INTRAVENOUS at 05:52

## 2017-01-01 RX ADMIN — SODIUM CHLORIDE 10 MILLILITER(S): 9 INJECTION INTRAMUSCULAR; INTRAVENOUS; SUBCUTANEOUS at 05:22

## 2017-01-01 RX ADMIN — HYDROMORPHONE HYDROCHLORIDE 0.5 MILLIGRAM(S): 2 INJECTION INTRAMUSCULAR; INTRAVENOUS; SUBCUTANEOUS at 23:45

## 2017-01-01 RX ADMIN — HYDROMORPHONE HYDROCHLORIDE 4 MILLIGRAM(S): 2 INJECTION INTRAMUSCULAR; INTRAVENOUS; SUBCUTANEOUS at 19:42

## 2017-01-01 RX ADMIN — HYDROMORPHONE HYDROCHLORIDE 2 MILLIGRAM(S): 2 INJECTION INTRAMUSCULAR; INTRAVENOUS; SUBCUTANEOUS at 19:05

## 2017-01-01 RX ADMIN — Medication 120 MILLIGRAM(S): at 05:14

## 2017-01-01 RX ADMIN — NYSTATIN CREAM 1 APPLICATION(S): 100000 CREAM TOPICAL at 18:11

## 2017-01-01 RX ADMIN — HYDROMORPHONE HYDROCHLORIDE 0.5 MILLIGRAM(S): 2 INJECTION INTRAMUSCULAR; INTRAVENOUS; SUBCUTANEOUS at 12:00

## 2017-01-01 RX ADMIN — HYDROMORPHONE HYDROCHLORIDE 0.5 MG/HR: 2 INJECTION INTRAMUSCULAR; INTRAVENOUS; SUBCUTANEOUS at 21:21

## 2017-01-01 RX ADMIN — HYDROMORPHONE HYDROCHLORIDE 1.5 MILLIGRAM(S): 2 INJECTION INTRAMUSCULAR; INTRAVENOUS; SUBCUTANEOUS at 00:11

## 2017-01-01 RX ADMIN — CEFEPIME 100 MILLIGRAM(S): 1 INJECTION, POWDER, FOR SOLUTION INTRAMUSCULAR; INTRAVENOUS at 06:03

## 2017-01-01 RX ADMIN — CEFEPIME 100 MILLIGRAM(S): 1 INJECTION, POWDER, FOR SOLUTION INTRAMUSCULAR; INTRAVENOUS at 16:01

## 2017-01-01 RX ADMIN — SODIUM CHLORIDE 10 MILLILITER(S): 9 INJECTION INTRAMUSCULAR; INTRAVENOUS; SUBCUTANEOUS at 07:49

## 2017-01-01 RX ADMIN — SODIUM CHLORIDE 100 MILLILITER(S): 9 INJECTION, SOLUTION INTRAVENOUS at 23:28

## 2017-01-01 RX ADMIN — SODIUM CHLORIDE 10 MILLILITER(S): 9 INJECTION INTRAMUSCULAR; INTRAVENOUS; SUBCUTANEOUS at 19:29

## 2017-01-01 RX ADMIN — NYSTATIN CREAM 1 APPLICATION(S): 100000 CREAM TOPICAL at 06:23

## 2017-01-01 RX ADMIN — HYDROMORPHONE HYDROCHLORIDE 4 MILLIGRAM(S): 2 INJECTION INTRAMUSCULAR; INTRAVENOUS; SUBCUTANEOUS at 15:10

## 2017-01-01 RX ADMIN — ENOXAPARIN SODIUM 40 MILLIGRAM(S): 100 INJECTION SUBCUTANEOUS at 12:03

## 2017-01-01 RX ADMIN — Medication 50 MICROGRAM(S): at 05:57

## 2017-01-01 RX ADMIN — Medication 20 MILLIGRAM(S): at 06:04

## 2017-01-01 RX ADMIN — SODIUM CHLORIDE 10 MILLILITER(S): 9 INJECTION INTRAMUSCULAR; INTRAVENOUS; SUBCUTANEOUS at 06:00

## 2017-01-01 RX ADMIN — SODIUM CHLORIDE 10 MILLILITER(S): 9 INJECTION INTRAMUSCULAR; INTRAVENOUS; SUBCUTANEOUS at 19:15

## 2017-01-01 RX ADMIN — SODIUM CHLORIDE 10 MILLILITER(S): 9 INJECTION INTRAMUSCULAR; INTRAVENOUS; SUBCUTANEOUS at 07:35

## 2017-01-01 RX ADMIN — HYDROMORPHONE HYDROCHLORIDE 0.5 MILLIGRAM(S): 2 INJECTION INTRAMUSCULAR; INTRAVENOUS; SUBCUTANEOUS at 11:30

## 2017-01-01 RX ADMIN — HYDROMORPHONE HYDROCHLORIDE 2 MG/HR: 2 INJECTION INTRAMUSCULAR; INTRAVENOUS; SUBCUTANEOUS at 14:46

## 2017-01-01 RX ADMIN — Medication 500 MILLIGRAM(S): at 21:30

## 2017-01-01 RX ADMIN — SODIUM CHLORIDE 75 MILLILITER(S): 9 INJECTION INTRAMUSCULAR; INTRAVENOUS; SUBCUTANEOUS at 19:52

## 2017-01-01 RX ADMIN — DIVALPROEX SODIUM 250 MILLIGRAM(S): 500 TABLET, DELAYED RELEASE ORAL at 21:52

## 2017-01-01 RX ADMIN — HYDROMORPHONE HYDROCHLORIDE 0.5 MILLIGRAM(S): 2 INJECTION INTRAMUSCULAR; INTRAVENOUS; SUBCUTANEOUS at 11:52

## 2017-01-01 RX ADMIN — SODIUM CHLORIDE 10 MILLILITER(S): 9 INJECTION INTRAMUSCULAR; INTRAVENOUS; SUBCUTANEOUS at 06:59

## 2017-01-01 RX ADMIN — Medication 500 MILLIGRAM(S): at 15:08

## 2017-01-01 RX ADMIN — HYDROMORPHONE HYDROCHLORIDE 1.4 MILLIGRAM(S): 2 INJECTION INTRAMUSCULAR; INTRAVENOUS; SUBCUTANEOUS at 22:00

## 2017-01-01 RX ADMIN — HYDROMORPHONE HYDROCHLORIDE 0.5 MILLIGRAM(S): 2 INJECTION INTRAMUSCULAR; INTRAVENOUS; SUBCUTANEOUS at 01:00

## 2017-01-01 RX ADMIN — MORPHINE SULFATE 4 MILLIGRAM(S): 50 CAPSULE, EXTENDED RELEASE ORAL at 00:05

## 2017-01-01 RX ADMIN — HYDROMORPHONE HYDROCHLORIDE 2 MILLIGRAM(S): 2 INJECTION INTRAMUSCULAR; INTRAVENOUS; SUBCUTANEOUS at 06:53

## 2017-01-01 RX ADMIN — HYDROMORPHONE HYDROCHLORIDE 0.5 MILLIGRAM(S): 2 INJECTION INTRAMUSCULAR; INTRAVENOUS; SUBCUTANEOUS at 05:30

## 2017-01-01 RX ADMIN — NYSTATIN CREAM 1 APPLICATION(S): 100000 CREAM TOPICAL at 06:09

## 2017-01-01 RX ADMIN — CEFEPIME 100 MILLIGRAM(S): 1 INJECTION, POWDER, FOR SOLUTION INTRAMUSCULAR; INTRAVENOUS at 05:10

## 2017-01-01 RX ADMIN — ENOXAPARIN SODIUM 40 MILLIGRAM(S): 100 INJECTION SUBCUTANEOUS at 11:57

## 2017-01-01 RX ADMIN — Medication 500 MILLIGRAM(S): at 16:03

## 2017-01-01 RX ADMIN — HYDROMORPHONE HYDROCHLORIDE 2 MG/HR: 2 INJECTION INTRAMUSCULAR; INTRAVENOUS; SUBCUTANEOUS at 10:53

## 2017-01-01 RX ADMIN — CEFEPIME 100 MILLIGRAM(S): 1 INJECTION, POWDER, FOR SOLUTION INTRAMUSCULAR; INTRAVENOUS at 14:00

## 2017-01-01 RX ADMIN — ROBINUL 0.4 MILLIGRAM(S): 0.2 INJECTION INTRAMUSCULAR; INTRAVENOUS at 16:09

## 2017-01-01 RX ADMIN — NYSTATIN CREAM 1 APPLICATION(S): 100000 CREAM TOPICAL at 17:25

## 2017-01-01 RX ADMIN — SODIUM CHLORIDE 75 MILLILITER(S): 9 INJECTION INTRAMUSCULAR; INTRAVENOUS; SUBCUTANEOUS at 05:15

## 2017-01-01 RX ADMIN — NYSTATIN CREAM 1 APPLICATION(S): 100000 CREAM TOPICAL at 17:56

## 2017-01-01 RX ADMIN — SODIUM CHLORIDE 100 MILLILITER(S): 9 INJECTION, SOLUTION INTRAVENOUS at 16:12

## 2017-01-01 RX ADMIN — HYDROMORPHONE HYDROCHLORIDE 0.5 MILLIGRAM(S): 2 INJECTION INTRAMUSCULAR; INTRAVENOUS; SUBCUTANEOUS at 06:03

## 2017-01-01 RX ADMIN — Medication 500 MILLIGRAM(S): at 21:33

## 2017-01-01 RX ADMIN — DIVALPROEX SODIUM 250 MILLIGRAM(S): 500 TABLET, DELAYED RELEASE ORAL at 21:33

## 2017-01-01 RX ADMIN — HYDROMORPHONE HYDROCHLORIDE 0.5 MILLIGRAM(S): 2 INJECTION INTRAMUSCULAR; INTRAVENOUS; SUBCUTANEOUS at 12:34

## 2017-01-01 RX ADMIN — HYDROMORPHONE HYDROCHLORIDE 0.5 MILLIGRAM(S): 2 INJECTION INTRAMUSCULAR; INTRAVENOUS; SUBCUTANEOUS at 10:13

## 2017-01-01 RX ADMIN — HYDROMORPHONE HYDROCHLORIDE 2 MG/HR: 2 INJECTION INTRAMUSCULAR; INTRAVENOUS; SUBCUTANEOUS at 19:28

## 2017-01-01 RX ADMIN — HYDROMORPHONE HYDROCHLORIDE 0.5 MILLIGRAM(S): 2 INJECTION INTRAMUSCULAR; INTRAVENOUS; SUBCUTANEOUS at 11:06

## 2017-01-01 RX ADMIN — Medication 500 MILLIGRAM(S): at 05:52

## 2017-01-01 RX ADMIN — Medication 50 MILLIGRAM(S): at 05:53

## 2017-01-01 RX ADMIN — SODIUM CHLORIDE 10 MILLILITER(S): 9 INJECTION INTRAMUSCULAR; INTRAVENOUS; SUBCUTANEOUS at 07:10

## 2017-01-01 RX ADMIN — CEFEPIME 100 MILLIGRAM(S): 1 INJECTION, POWDER, FOR SOLUTION INTRAMUSCULAR; INTRAVENOUS at 00:05

## 2017-01-01 RX ADMIN — QUETIAPINE FUMARATE 50 MILLIGRAM(S): 200 TABLET, FILM COATED ORAL at 21:30

## 2017-01-01 RX ADMIN — NYSTATIN CREAM 1 APPLICATION(S): 100000 CREAM TOPICAL at 05:22

## 2017-01-01 RX ADMIN — HYDROMORPHONE HYDROCHLORIDE 2 MILLIGRAM(S): 2 INJECTION INTRAMUSCULAR; INTRAVENOUS; SUBCUTANEOUS at 18:13

## 2017-01-01 RX ADMIN — SODIUM CHLORIDE 100 MILLILITER(S): 9 INJECTION INTRAMUSCULAR; INTRAVENOUS; SUBCUTANEOUS at 06:06

## 2017-01-01 RX ADMIN — SODIUM CHLORIDE 10 MILLILITER(S): 9 INJECTION INTRAMUSCULAR; INTRAVENOUS; SUBCUTANEOUS at 05:18

## 2017-01-01 RX ADMIN — NYSTATIN CREAM 1 APPLICATION(S): 100000 CREAM TOPICAL at 05:19

## 2017-01-01 RX ADMIN — DIVALPROEX SODIUM 250 MILLIGRAM(S): 500 TABLET, DELAYED RELEASE ORAL at 22:55

## 2017-01-01 RX ADMIN — NYSTATIN CREAM 1 APPLICATION(S): 100000 CREAM TOPICAL at 05:35

## 2017-01-01 RX ADMIN — DIVALPROEX SODIUM 500 MILLIGRAM(S): 500 TABLET, DELAYED RELEASE ORAL at 11:57

## 2017-01-01 RX ADMIN — SODIUM CHLORIDE 10 MILLILITER(S): 9 INJECTION INTRAMUSCULAR; INTRAVENOUS; SUBCUTANEOUS at 06:56

## 2017-01-01 RX ADMIN — ENOXAPARIN SODIUM 40 MILLIGRAM(S): 100 INJECTION SUBCUTANEOUS at 12:44

## 2017-01-01 RX ADMIN — NYSTATIN CREAM 1 APPLICATION(S): 100000 CREAM TOPICAL at 05:08

## 2017-01-01 RX ADMIN — DIVALPROEX SODIUM 250 MILLIGRAM(S): 500 TABLET, DELAYED RELEASE ORAL at 21:31

## 2017-01-01 RX ADMIN — NYSTATIN CREAM 1 APPLICATION(S): 100000 CREAM TOPICAL at 05:57

## 2017-01-01 RX ADMIN — HYDROMORPHONE HYDROCHLORIDE 2 MG/HR: 2 INJECTION INTRAMUSCULAR; INTRAVENOUS; SUBCUTANEOUS at 19:07

## 2017-01-01 RX ADMIN — HYDROMORPHONE HYDROCHLORIDE 4 MILLIGRAM(S): 2 INJECTION INTRAMUSCULAR; INTRAVENOUS; SUBCUTANEOUS at 14:55

## 2017-01-01 RX ADMIN — HYDROMORPHONE HYDROCHLORIDE 4 MILLIGRAM(S): 2 INJECTION INTRAMUSCULAR; INTRAVENOUS; SUBCUTANEOUS at 19:57

## 2017-01-01 RX ADMIN — ROBINUL 0.4 MILLIGRAM(S): 0.2 INJECTION INTRAMUSCULAR; INTRAVENOUS at 17:42

## 2017-01-01 RX ADMIN — HYDROMORPHONE HYDROCHLORIDE 1.4 MILLIGRAM(S): 2 INJECTION INTRAMUSCULAR; INTRAVENOUS; SUBCUTANEOUS at 16:09

## 2017-01-01 RX ADMIN — Medication 120 MILLIGRAM(S): at 06:02

## 2017-01-01 RX ADMIN — HYDROMORPHONE HYDROCHLORIDE 2 MILLIGRAM(S): 2 INJECTION INTRAMUSCULAR; INTRAVENOUS; SUBCUTANEOUS at 00:45

## 2017-01-01 RX ADMIN — HYDROMORPHONE HYDROCHLORIDE 4 MILLIGRAM(S): 2 INJECTION INTRAMUSCULAR; INTRAVENOUS; SUBCUTANEOUS at 17:58

## 2017-01-01 RX ADMIN — MORPHINE SULFATE 2 MILLIGRAM(S): 50 CAPSULE, EXTENDED RELEASE ORAL at 03:04

## 2017-01-01 RX ADMIN — Medication 50 MICROGRAM(S): at 05:14

## 2017-01-01 RX ADMIN — HYDROMORPHONE HYDROCHLORIDE 2 MG/HR: 2 INJECTION INTRAMUSCULAR; INTRAVENOUS; SUBCUTANEOUS at 16:17

## 2017-01-01 RX ADMIN — CEFEPIME 100 MILLIGRAM(S): 1 INJECTION, POWDER, FOR SOLUTION INTRAMUSCULAR; INTRAVENOUS at 05:15

## 2017-01-01 RX ADMIN — Medication 120 MILLIGRAM(S): at 05:56

## 2017-01-01 RX ADMIN — CEFEPIME 100 MILLIGRAM(S): 1 INJECTION, POWDER, FOR SOLUTION INTRAMUSCULAR; INTRAVENOUS at 22:54

## 2017-01-01 RX ADMIN — CEFEPIME 100 MILLIGRAM(S): 1 INJECTION, POWDER, FOR SOLUTION INTRAMUSCULAR; INTRAVENOUS at 16:05

## 2017-01-01 RX ADMIN — NYSTATIN CREAM 1 APPLICATION(S): 100000 CREAM TOPICAL at 05:11

## 2017-01-01 RX ADMIN — HYDROMORPHONE HYDROCHLORIDE 1 MG/HR: 2 INJECTION INTRAMUSCULAR; INTRAVENOUS; SUBCUTANEOUS at 14:07

## 2017-01-01 RX ADMIN — SODIUM CHLORIDE 10 MILLILITER(S): 9 INJECTION INTRAMUSCULAR; INTRAVENOUS; SUBCUTANEOUS at 19:08

## 2017-01-01 RX ADMIN — HYDROMORPHONE HYDROCHLORIDE 1.5 MG/HR: 2 INJECTION INTRAMUSCULAR; INTRAVENOUS; SUBCUTANEOUS at 09:22

## 2017-01-01 RX ADMIN — HYDROMORPHONE HYDROCHLORIDE 0.5 MILLIGRAM(S): 2 INJECTION INTRAMUSCULAR; INTRAVENOUS; SUBCUTANEOUS at 00:40

## 2017-01-01 RX ADMIN — HYDROMORPHONE HYDROCHLORIDE 8 MILLIGRAM(S): 2 INJECTION INTRAMUSCULAR; INTRAVENOUS; SUBCUTANEOUS at 11:03

## 2017-01-01 RX ADMIN — Medication 50 MICROGRAM(S): at 05:52

## 2017-01-01 RX ADMIN — HYDROMORPHONE HYDROCHLORIDE 0.7 MG/HR: 2 INJECTION INTRAMUSCULAR; INTRAVENOUS; SUBCUTANEOUS at 07:49

## 2017-01-01 RX ADMIN — SODIUM CHLORIDE 10 MILLILITER(S): 9 INJECTION INTRAMUSCULAR; INTRAVENOUS; SUBCUTANEOUS at 21:22

## 2017-01-01 RX ADMIN — QUETIAPINE FUMARATE 25 MILLIGRAM(S): 200 TABLET, FILM COATED ORAL at 12:44

## 2017-01-01 RX ADMIN — HYDROMORPHONE HYDROCHLORIDE 8 MILLIGRAM(S): 2 INJECTION INTRAMUSCULAR; INTRAVENOUS; SUBCUTANEOUS at 02:01

## 2017-01-01 RX ADMIN — HYDROMORPHONE HYDROCHLORIDE 8 MILLIGRAM(S): 2 INJECTION INTRAMUSCULAR; INTRAVENOUS; SUBCUTANEOUS at 07:49

## 2017-01-01 RX ADMIN — HYDROMORPHONE HYDROCHLORIDE 0.7 MG/HR: 2 INJECTION INTRAMUSCULAR; INTRAVENOUS; SUBCUTANEOUS at 07:16

## 2017-01-01 RX ADMIN — HYDROMORPHONE HYDROCHLORIDE 0.5 MILLIGRAM(S): 2 INJECTION INTRAMUSCULAR; INTRAVENOUS; SUBCUTANEOUS at 11:42

## 2017-01-01 RX ADMIN — HYDROMORPHONE HYDROCHLORIDE 1.4 MILLIGRAM(S): 2 INJECTION INTRAMUSCULAR; INTRAVENOUS; SUBCUTANEOUS at 07:59

## 2017-01-01 RX ADMIN — HYDROMORPHONE HYDROCHLORIDE 4 MG/HR: 2 INJECTION INTRAMUSCULAR; INTRAVENOUS; SUBCUTANEOUS at 07:35

## 2017-01-01 RX ADMIN — QUETIAPINE FUMARATE 50 MILLIGRAM(S): 200 TABLET, FILM COATED ORAL at 22:56

## 2017-01-01 RX ADMIN — MEROPENEM 200 MILLIGRAM(S): 1 INJECTION INTRAVENOUS at 13:02

## 2017-01-01 RX ADMIN — SODIUM CHLORIDE 75 MILLILITER(S): 9 INJECTION INTRAMUSCULAR; INTRAVENOUS; SUBCUTANEOUS at 21:09

## 2017-01-01 RX ADMIN — SODIUM CHLORIDE 100 MILLILITER(S): 9 INJECTION, SOLUTION INTRAVENOUS at 21:51

## 2017-01-01 RX ADMIN — QUETIAPINE FUMARATE 50 MILLIGRAM(S): 200 TABLET, FILM COATED ORAL at 21:33

## 2017-01-01 RX ADMIN — DIVALPROEX SODIUM 500 MILLIGRAM(S): 500 TABLET, DELAYED RELEASE ORAL at 13:06

## 2017-01-01 RX ADMIN — NYSTATIN CREAM 1 APPLICATION(S): 100000 CREAM TOPICAL at 06:00

## 2017-01-01 RX ADMIN — HYDROMORPHONE HYDROCHLORIDE 1.5 MILLIGRAM(S): 2 INJECTION INTRAMUSCULAR; INTRAVENOUS; SUBCUTANEOUS at 00:26

## 2017-01-01 RX ADMIN — HYDROMORPHONE HYDROCHLORIDE 2 MG/HR: 2 INJECTION INTRAMUSCULAR; INTRAVENOUS; SUBCUTANEOUS at 19:15

## 2017-01-01 RX ADMIN — HYDROMORPHONE HYDROCHLORIDE 0.5 MILLIGRAM(S): 2 INJECTION INTRAMUSCULAR; INTRAVENOUS; SUBCUTANEOUS at 06:33

## 2017-01-01 RX ADMIN — HYDROMORPHONE HYDROCHLORIDE 4 MILLIGRAM(S): 2 INJECTION INTRAMUSCULAR; INTRAVENOUS; SUBCUTANEOUS at 10:22

## 2017-01-01 RX ADMIN — Medication 100 MILLIGRAM(S): at 15:07

## 2017-01-01 RX ADMIN — SODIUM CHLORIDE 10 MILLILITER(S): 9 INJECTION INTRAMUSCULAR; INTRAVENOUS; SUBCUTANEOUS at 07:33

## 2017-01-01 RX ADMIN — HYDROMORPHONE HYDROCHLORIDE 4 MG/HR: 2 INJECTION INTRAMUSCULAR; INTRAVENOUS; SUBCUTANEOUS at 21:30

## 2017-01-01 RX ADMIN — Medication 500 MILLIGRAM(S): at 23:18

## 2017-01-01 RX ADMIN — HYDROMORPHONE HYDROCHLORIDE 0.7 MG/HR: 2 INJECTION INTRAMUSCULAR; INTRAVENOUS; SUBCUTANEOUS at 13:38

## 2017-01-01 RX ADMIN — HYDROMORPHONE HYDROCHLORIDE 4 MILLIGRAM(S): 2 INJECTION INTRAMUSCULAR; INTRAVENOUS; SUBCUTANEOUS at 10:07

## 2017-01-01 RX ADMIN — HYDROMORPHONE HYDROCHLORIDE 0.5 MILLIGRAM(S): 2 INJECTION INTRAMUSCULAR; INTRAVENOUS; SUBCUTANEOUS at 05:51

## 2017-01-01 RX ADMIN — HYDROMORPHONE HYDROCHLORIDE 8 MILLIGRAM(S): 2 INJECTION INTRAMUSCULAR; INTRAVENOUS; SUBCUTANEOUS at 18:10

## 2017-01-01 RX ADMIN — NYSTATIN CREAM 1 APPLICATION(S): 100000 CREAM TOPICAL at 05:53

## 2017-01-01 RX ADMIN — NYSTATIN CREAM 1 APPLICATION(S): 100000 CREAM TOPICAL at 17:18

## 2017-01-01 RX ADMIN — DIVALPROEX SODIUM 500 MILLIGRAM(S): 500 TABLET, DELAYED RELEASE ORAL at 15:07

## 2017-01-01 RX ADMIN — SODIUM CHLORIDE 10 MILLILITER(S): 9 INJECTION INTRAMUSCULAR; INTRAVENOUS; SUBCUTANEOUS at 19:41

## 2017-01-01 RX ADMIN — HYDROMORPHONE HYDROCHLORIDE 8 MILLIGRAM(S): 2 INJECTION INTRAMUSCULAR; INTRAVENOUS; SUBCUTANEOUS at 17:20

## 2017-01-01 RX ADMIN — SODIUM CHLORIDE 10 MILLILITER(S): 9 INJECTION INTRAMUSCULAR; INTRAVENOUS; SUBCUTANEOUS at 03:00

## 2017-01-01 RX ADMIN — CEFEPIME 100 MILLIGRAM(S): 1 INJECTION, POWDER, FOR SOLUTION INTRAMUSCULAR; INTRAVENOUS at 21:33

## 2017-01-01 RX ADMIN — Medication 50 MICROGRAM(S): at 06:04

## 2017-01-01 RX ADMIN — Medication 0.5 MILLIGRAM(S): at 20:27

## 2017-01-01 RX ADMIN — CEFEPIME 100 MILLIGRAM(S): 1 INJECTION, POWDER, FOR SOLUTION INTRAMUSCULAR; INTRAVENOUS at 21:30

## 2017-01-01 RX ADMIN — Medication 0.5 MILLIGRAM(S): at 02:01

## 2017-01-01 RX ADMIN — NYSTATIN CREAM 1 APPLICATION(S): 100000 CREAM TOPICAL at 17:30

## 2017-01-01 RX ADMIN — HYDROMORPHONE HYDROCHLORIDE 4 MILLIGRAM(S): 2 INJECTION INTRAMUSCULAR; INTRAVENOUS; SUBCUTANEOUS at 17:03

## 2017-01-01 RX ADMIN — DIVALPROEX SODIUM 250 MILLIGRAM(S): 500 TABLET, DELAYED RELEASE ORAL at 21:08

## 2017-01-01 RX ADMIN — HYDROMORPHONE HYDROCHLORIDE 2 MILLIGRAM(S): 2 INJECTION INTRAMUSCULAR; INTRAVENOUS; SUBCUTANEOUS at 06:38

## 2017-01-01 RX ADMIN — HYDROMORPHONE HYDROCHLORIDE 1 MG/HR: 2 INJECTION INTRAMUSCULAR; INTRAVENOUS; SUBCUTANEOUS at 19:41

## 2017-01-01 RX ADMIN — HYDROMORPHONE HYDROCHLORIDE 0.5 MILLIGRAM(S): 2 INJECTION INTRAMUSCULAR; INTRAVENOUS; SUBCUTANEOUS at 20:10

## 2017-01-01 RX ADMIN — Medication 120 MILLIGRAM(S): at 05:52

## 2017-01-01 RX ADMIN — HYDROMORPHONE HYDROCHLORIDE 0.5 MILLIGRAM(S): 2 INJECTION INTRAMUSCULAR; INTRAVENOUS; SUBCUTANEOUS at 09:53

## 2017-01-01 RX ADMIN — NYSTATIN CREAM 1 APPLICATION(S): 100000 CREAM TOPICAL at 05:32

## 2017-01-01 RX ADMIN — SODIUM CHLORIDE 10 MILLILITER(S): 9 INJECTION INTRAMUSCULAR; INTRAVENOUS; SUBCUTANEOUS at 19:56

## 2017-01-01 RX ADMIN — QUETIAPINE FUMARATE 25 MILLIGRAM(S): 200 TABLET, FILM COATED ORAL at 15:08

## 2017-01-01 RX ADMIN — NYSTATIN CREAM 1 APPLICATION(S): 100000 CREAM TOPICAL at 05:15

## 2017-01-01 RX ADMIN — Medication 500 MILLIGRAM(S): at 21:08

## 2017-01-01 RX ADMIN — QUETIAPINE FUMARATE 50 MILLIGRAM(S): 200 TABLET, FILM COATED ORAL at 21:51

## 2017-01-01 RX ADMIN — SODIUM CHLORIDE 10 MILLILITER(S): 9 INJECTION INTRAMUSCULAR; INTRAVENOUS; SUBCUTANEOUS at 13:16

## 2017-01-01 RX ADMIN — HYDROMORPHONE HYDROCHLORIDE 4 MG/HR: 2 INJECTION INTRAMUSCULAR; INTRAVENOUS; SUBCUTANEOUS at 19:57

## 2017-01-01 RX ADMIN — HYDROMORPHONE HYDROCHLORIDE 0.7 MG/HR: 2 INJECTION INTRAMUSCULAR; INTRAVENOUS; SUBCUTANEOUS at 18:11

## 2017-01-01 RX ADMIN — HYDROMORPHONE HYDROCHLORIDE 8 MILLIGRAM(S): 2 INJECTION INTRAMUSCULAR; INTRAVENOUS; SUBCUTANEOUS at 16:52

## 2017-01-01 RX ADMIN — Medication 50 MICROGRAM(S): at 05:10

## 2017-01-01 RX ADMIN — MORPHINE SULFATE 2 MILLIGRAM(S): 50 CAPSULE, EXTENDED RELEASE ORAL at 19:26

## 2017-01-01 RX ADMIN — Medication 120 MILLIGRAM(S): at 06:41

## 2017-01-01 RX ADMIN — Medication 250 MILLIGRAM(S): at 17:16

## 2017-01-01 RX ADMIN — NYSTATIN CREAM 1 APPLICATION(S): 100000 CREAM TOPICAL at 17:06

## 2017-01-01 RX ADMIN — Medication 50 MILLIGRAM(S): at 21:51

## 2017-01-01 RX ADMIN — HYDROMORPHONE HYDROCHLORIDE 4 MG/HR: 2 INJECTION INTRAMUSCULAR; INTRAVENOUS; SUBCUTANEOUS at 12:41

## 2017-01-01 RX ADMIN — HYDROMORPHONE HYDROCHLORIDE 0.5 MILLIGRAM(S): 2 INJECTION INTRAMUSCULAR; INTRAVENOUS; SUBCUTANEOUS at 10:23

## 2017-01-01 RX ADMIN — HYDROMORPHONE HYDROCHLORIDE 1 MG/HR: 2 INJECTION INTRAMUSCULAR; INTRAVENOUS; SUBCUTANEOUS at 07:33

## 2017-01-01 RX ADMIN — HYDROMORPHONE HYDROCHLORIDE 2 MILLIGRAM(S): 2 INJECTION INTRAMUSCULAR; INTRAVENOUS; SUBCUTANEOUS at 18:50

## 2017-01-01 RX ADMIN — CEFEPIME 100 MILLIGRAM(S): 1 INJECTION, POWDER, FOR SOLUTION INTRAMUSCULAR; INTRAVENOUS at 21:09

## 2017-01-01 RX ADMIN — HYDROMORPHONE HYDROCHLORIDE 0.5 MILLIGRAM(S): 2 INJECTION INTRAMUSCULAR; INTRAVENOUS; SUBCUTANEOUS at 09:43

## 2017-01-01 RX ADMIN — Medication 500 MILLIGRAM(S): at 06:04

## 2017-01-01 RX ADMIN — NYSTATIN CREAM 1 APPLICATION(S): 100000 CREAM TOPICAL at 18:19

## 2017-01-01 RX ADMIN — HYDROMORPHONE HYDROCHLORIDE 8 MILLIGRAM(S): 2 INJECTION INTRAMUSCULAR; INTRAVENOUS; SUBCUTANEOUS at 17:55

## 2017-01-01 RX ADMIN — Medication 500 MILLIGRAM(S): at 05:11

## 2017-01-01 RX ADMIN — NYSTATIN CREAM 1 APPLICATION(S): 100000 CREAM TOPICAL at 05:12

## 2017-01-01 RX ADMIN — Medication 63.75 MILLIMOLE(S): at 15:07

## 2017-01-01 RX ADMIN — HYDROMORPHONE HYDROCHLORIDE 1 MG/HR: 2 INJECTION INTRAMUSCULAR; INTRAVENOUS; SUBCUTANEOUS at 09:20

## 2017-01-01 RX ADMIN — HYDROMORPHONE HYDROCHLORIDE 0.5 MILLIGRAM(S): 2 INJECTION INTRAMUSCULAR; INTRAVENOUS; SUBCUTANEOUS at 06:15

## 2017-01-01 RX ADMIN — Medication 100 MILLIGRAM(S): at 12:43

## 2017-01-01 RX ADMIN — HYDROMORPHONE HYDROCHLORIDE 2 MILLIGRAM(S): 2 INJECTION INTRAMUSCULAR; INTRAVENOUS; SUBCUTANEOUS at 07:53

## 2017-01-01 RX ADMIN — HYDROMORPHONE HYDROCHLORIDE 0.5 MILLIGRAM(S): 2 INJECTION INTRAMUSCULAR; INTRAVENOUS; SUBCUTANEOUS at 18:29

## 2017-01-01 RX ADMIN — HYDROMORPHONE HYDROCHLORIDE 2 MILLIGRAM(S): 2 INJECTION INTRAMUSCULAR; INTRAVENOUS; SUBCUTANEOUS at 00:30

## 2017-01-01 RX ADMIN — DIVALPROEX SODIUM 500 MILLIGRAM(S): 500 TABLET, DELAYED RELEASE ORAL at 12:43

## 2017-01-01 RX ADMIN — Medication 500 MILLIGRAM(S): at 16:04

## 2017-01-01 RX ADMIN — MORPHINE SULFATE 2 MILLIGRAM(S): 50 CAPSULE, EXTENDED RELEASE ORAL at 03:36

## 2017-01-01 RX ADMIN — HYDROMORPHONE HYDROCHLORIDE 2 MILLIGRAM(S): 2 INJECTION INTRAMUSCULAR; INTRAVENOUS; SUBCUTANEOUS at 18:23

## 2017-01-01 RX ADMIN — NYSTATIN CREAM 1 APPLICATION(S): 100000 CREAM TOPICAL at 16:00

## 2017-01-01 RX ADMIN — HYDROMORPHONE HYDROCHLORIDE 2 MG/HR: 2 INJECTION INTRAMUSCULAR; INTRAVENOUS; SUBCUTANEOUS at 07:09

## 2017-01-01 RX ADMIN — HYDROMORPHONE HYDROCHLORIDE 2 MG/HR: 2 INJECTION INTRAMUSCULAR; INTRAVENOUS; SUBCUTANEOUS at 14:55

## 2017-01-01 RX ADMIN — HYDROMORPHONE HYDROCHLORIDE 2 MILLIGRAM(S): 2 INJECTION INTRAMUSCULAR; INTRAVENOUS; SUBCUTANEOUS at 07:38

## 2017-01-01 RX ADMIN — SODIUM CHLORIDE 10 MILLILITER(S): 9 INJECTION INTRAMUSCULAR; INTRAVENOUS; SUBCUTANEOUS at 00:23

## 2017-01-01 RX ADMIN — HYDROMORPHONE HYDROCHLORIDE 0.5 MILLIGRAM(S): 2 INJECTION INTRAMUSCULAR; INTRAVENOUS; SUBCUTANEOUS at 03:48

## 2017-01-01 RX ADMIN — NYSTATIN CREAM 1 APPLICATION(S): 100000 CREAM TOPICAL at 18:13

## 2017-01-01 RX ADMIN — HYDROMORPHONE HYDROCHLORIDE 1.4 MILLIGRAM(S): 2 INJECTION INTRAMUSCULAR; INTRAVENOUS; SUBCUTANEOUS at 08:14

## 2017-01-01 RX ADMIN — Medication 0.5 MILLIGRAM(S): at 16:15

## 2017-01-01 RX ADMIN — SODIUM CHLORIDE 75 MILLILITER(S): 9 INJECTION INTRAMUSCULAR; INTRAVENOUS; SUBCUTANEOUS at 09:25

## 2017-01-01 RX ADMIN — Medication 0.5 MILLIGRAM(S): at 22:15

## 2017-01-01 RX ADMIN — HYDROMORPHONE HYDROCHLORIDE 0.5 MILLIGRAM(S): 2 INJECTION INTRAMUSCULAR; INTRAVENOUS; SUBCUTANEOUS at 23:14

## 2017-01-01 RX ADMIN — HYDROMORPHONE HYDROCHLORIDE 1 MG/HR: 2 INJECTION INTRAMUSCULAR; INTRAVENOUS; SUBCUTANEOUS at 18:30

## 2017-01-01 RX ADMIN — NYSTATIN CREAM 1 APPLICATION(S): 100000 CREAM TOPICAL at 06:53

## 2017-01-01 RX ADMIN — NYSTATIN CREAM 1 APPLICATION(S): 100000 CREAM TOPICAL at 18:30

## 2017-01-01 RX ADMIN — NYSTATIN CREAM 1 APPLICATION(S): 100000 CREAM TOPICAL at 17:15

## 2017-01-01 RX ADMIN — Medication 0.5 MILLIGRAM(S): at 10:52

## 2017-01-01 RX ADMIN — ENOXAPARIN SODIUM 40 MILLIGRAM(S): 100 INJECTION SUBCUTANEOUS at 13:06

## 2017-01-01 RX ADMIN — HYDROMORPHONE HYDROCHLORIDE 1 MG/HR: 2 INJECTION INTRAMUSCULAR; INTRAVENOUS; SUBCUTANEOUS at 18:50

## 2017-01-01 RX ADMIN — HYDROMORPHONE HYDROCHLORIDE 1.5 MG/HR: 2 INJECTION INTRAMUSCULAR; INTRAVENOUS; SUBCUTANEOUS at 14:13

## 2017-01-01 RX ADMIN — QUETIAPINE FUMARATE 50 MILLIGRAM(S): 200 TABLET, FILM COATED ORAL at 21:08

## 2017-01-01 RX ADMIN — HYDROMORPHONE HYDROCHLORIDE 0.5 MILLIGRAM(S): 2 INJECTION INTRAMUSCULAR; INTRAVENOUS; SUBCUTANEOUS at 01:15

## 2017-01-01 RX ADMIN — HYDROMORPHONE HYDROCHLORIDE 2 MG/HR: 2 INJECTION INTRAMUSCULAR; INTRAVENOUS; SUBCUTANEOUS at 07:35

## 2017-01-01 RX ADMIN — SODIUM CHLORIDE 100 MILLILITER(S): 9 INJECTION, SOLUTION INTRAVENOUS at 17:02

## 2017-01-01 RX ADMIN — HYDROMORPHONE HYDROCHLORIDE 2 MG/HR: 2 INJECTION INTRAMUSCULAR; INTRAVENOUS; SUBCUTANEOUS at 13:15

## 2017-01-01 RX ADMIN — NYSTATIN CREAM 1 APPLICATION(S): 100000 CREAM TOPICAL at 18:36

## 2017-01-01 RX ADMIN — ENOXAPARIN SODIUM 40 MILLIGRAM(S): 100 INJECTION SUBCUTANEOUS at 15:06

## 2017-01-01 RX ADMIN — SODIUM CHLORIDE 10 MILLILITER(S): 9 INJECTION INTRAMUSCULAR; INTRAVENOUS; SUBCUTANEOUS at 06:37

## 2017-01-01 RX ADMIN — HYDROMORPHONE HYDROCHLORIDE 0.5 MG/HR: 2 INJECTION INTRAMUSCULAR; INTRAVENOUS; SUBCUTANEOUS at 12:25

## 2017-01-01 RX ADMIN — HYDROMORPHONE HYDROCHLORIDE 4 MILLIGRAM(S): 2 INJECTION INTRAMUSCULAR; INTRAVENOUS; SUBCUTANEOUS at 17:43

## 2017-01-01 RX ADMIN — Medication 500 MILLIGRAM(S): at 05:14

## 2017-01-01 RX ADMIN — HYDROMORPHONE HYDROCHLORIDE 1 MG/HR: 2 INJECTION INTRAMUSCULAR; INTRAVENOUS; SUBCUTANEOUS at 13:47

## 2017-01-01 RX ADMIN — NYSTATIN CREAM 1 APPLICATION(S): 100000 CREAM TOPICAL at 06:45

## 2017-01-01 RX ADMIN — QUETIAPINE FUMARATE 25 MILLIGRAM(S): 200 TABLET, FILM COATED ORAL at 11:57

## 2017-01-01 RX ADMIN — HYDROMORPHONE HYDROCHLORIDE 0.5 MILLIGRAM(S): 2 INJECTION INTRAMUSCULAR; INTRAVENOUS; SUBCUTANEOUS at 00:45

## 2017-01-01 RX ADMIN — Medication 10 MILLIGRAM(S): at 11:05

## 2017-01-01 RX ADMIN — HYDROMORPHONE HYDROCHLORIDE 1 MG/HR: 2 INJECTION INTRAMUSCULAR; INTRAVENOUS; SUBCUTANEOUS at 06:35

## 2017-01-01 RX ADMIN — HYDROMORPHONE HYDROCHLORIDE 8 MILLIGRAM(S): 2 INJECTION INTRAMUSCULAR; INTRAVENOUS; SUBCUTANEOUS at 07:34

## 2017-01-01 RX ADMIN — HYDROMORPHONE HYDROCHLORIDE 0.5 MILLIGRAM(S): 2 INJECTION INTRAMUSCULAR; INTRAVENOUS; SUBCUTANEOUS at 17:59

## 2017-01-01 RX ADMIN — NYSTATIN CREAM 1 APPLICATION(S): 100000 CREAM TOPICAL at 19:43

## 2017-01-01 RX ADMIN — NYSTATIN CREAM 1 APPLICATION(S): 100000 CREAM TOPICAL at 17:50

## 2017-01-01 RX ADMIN — Medication 100 MILLIGRAM(S): at 22:55

## 2017-01-01 RX ADMIN — Medication 120 MILLIGRAM(S): at 05:11

## 2017-01-01 RX ADMIN — CEFEPIME 100 MILLIGRAM(S): 1 INJECTION, POWDER, FOR SOLUTION INTRAMUSCULAR; INTRAVENOUS at 16:00

## 2017-01-01 RX ADMIN — Medication 50 MICROGRAM(S): at 06:03

## 2017-01-01 RX ADMIN — HYDROMORPHONE HYDROCHLORIDE 0.5 MILLIGRAM(S): 2 INJECTION INTRAMUSCULAR; INTRAVENOUS; SUBCUTANEOUS at 06:00

## 2017-09-01 NOTE — ED ADULT NURSE NOTE - OBJECTIVE STATEMENT
83 yr old female came in with abd pain and yellow skin.  she is coming from a nursing home where they think she has a blockage as she also has gallstones. on assessment a and o x 3 lungs clear abd soft non tender no swelling in extremities no n/v/d/ no fevers. eye are yellow and skin is yellow. no other issues or complaints.

## 2017-09-01 NOTE — ED PROVIDER NOTE - MEDICAL DECISION MAKING DETAILS
Attending Note (Sharifa): patient sent in from facility for jaundice.  tender ruq.  US attempted but aborted due to pain and patient unable to tolerate positioning.  concern for mass vs cholecystitis. will perform CT abd/pelvis and reassess.

## 2017-09-01 NOTE — ED PROVIDER NOTE - PHYSICAL EXAMINATION
Gen: AAO x 3, NAD  Skin: No rashes or lesions  HEENT: NC/AT, PERRLA, EOMI, MMM  Resp: unlabored CTAB  Cardiac: rrr s1s2, no murmurs, rubs or gallops  GI: ND, +BS, Soft, DIffuse TTP worse in the epigastrium and RUQ.  +Petersen's sign  Ext: no pedal edema, FROM in all extremities  Neuro: no focal deficits Gen: AAO x 3, NAD  Skin: jaundice  HEENT: NC/AT, PERRLA, +Icterus, EOMI, MMM  Resp: unlabored CTAB  Cardiac: rrr s1s2, no murmurs, rubs or gallops  GI: ND, +BS, Soft, DIffuse TTP worse in the epigastrium and RUQ.  +Petersen's sign  Ext: no pedal edema, FROM in all extremities  Neuro: no focal deficits

## 2017-09-01 NOTE — ED PROVIDER NOTE - OBJECTIVE STATEMENT
82 yo female with PMHx or COPD on home O2, Bipolar DO, DM, HTN p/w epigastric pain and jaundice.  The patient reports that she has had epigastric abd pain x 2 weeks.  pain is associated with jaundice and anorexia.  Patient had blood work performed at her NH this week which shows transaminitis, hyperbilirubinemia.  Currently denies fevers/chill, CP, SOB, NVD, but admits to constipation.

## 2017-09-01 NOTE — ED PROVIDER NOTE - PSH
History of Tonsillectomy    S/P Endoscopy  previous food impactions with interventions ....not sure when

## 2017-09-01 NOTE — ED PROVIDER NOTE - PMH
Bipolar disorder    Cellulitis  b/l lower extremties  HTN (Hypertension)    Hx of rheumatoid arthritis    Hypothyroidism    Parkinson's Disease    Schizophrenia

## 2017-09-02 NOTE — PROGRESS NOTE ADULT - PROBLEM SELECTOR PLAN 1
- CT a/p shows common bile duct, intrahepatic, as well as pancreatic duct dilation, may be 2/2 distal common bile duct stone vs. pancreatic mass (multiple indeterminate pancreatic masses visualized).  - f/u MRCP   - GI consulted, will f/u recs   - lipase wnl   - continue IVF and pain control as needed

## 2017-09-02 NOTE — H&P ADULT - PROBLEM SELECTOR PLAN 3
- Bladdar wall thickening observed on CT - f/u with [facility] - Bladdar wall thickening observed on CT, but asymptomatic  - will f/u with [facility]

## 2017-09-02 NOTE — H&P ADULT - PROBLEM SELECTOR PLAN 9
- MOLST form in chart - confirmed with patient that she is DNR.  Patient not DNI. - has bilateral calf pain, no erythema, no swelling . Denies history of DVTs.  - given ?malignancy and possible hypercoaguable state, will check bilateral lower extremity dopplers.

## 2017-09-02 NOTE — H&P ADULT - NSHPSOCIALHISTORY_GEN_ALL_CORE
lives in nursing home, wheelchair bound most of the time however uses walker twice a day.  No recent travel, no sick contacts.  Minimal etoh use, never smoker.

## 2017-09-02 NOTE — H&P ADULT - HISTORY OF PRESENT ILLNESS
83F PMH COPD (on home 2L 02), bipolar disorder, htn, gerd, hypothyroidism presents from nursing home w/ worsening jaundice over past two weeks.  Also with light light yellow BMs.  Has had worsening epigastric pain as well as worsening rib and back pain .  Denies fevers, chills, chest pain, nausea, vomiting.  She had labwork done at nursing home 9/1/17 which showed bilirubin 12.2 (9.9 direct), w/ , , alk phos 546 so was sent to [facility] for evaluation.  Reports decreased PO intake the past two days 2/2 no appetite. Denies weight loss.      In the ED VS 98.6 92 100/66 16 99%RA  Labs notable for WBC 11.7 Hgb 10.8, .  BUN 13 Cr 0.91 glucose 115. Alb 3.1 Bili 13.5, 12.5 direct. Alk phos 588      CT A/P w/ common bile duct and moderate intrahepatic biliary dilatation.  Pancreatic ductal dilatation.  This may be in part due to distal common bile duct   stone although underlying duodenal/ampullary neoplasm cannot be excluded.    given soft tissue structure in the second portion of the duodenum. This   can be further characterized on a follow-up MRI/MRCP.    Pancreatic ductal dilatation and multiple indeterminate pancreatic   lesions, which may be in part due to IPMN and can also be characterized   on a follow-up MRI/MRCP.     Nonspecific mild peripancreatic stranding. Recommend correlation with   serum lipase to assess acute pancreatitis.    Indeterminate bilateral adnexal lesions. Borderline endometrium.   Endometrial/ovarian neoplasm cannot be excluded. Recommend follow-up.    Bladder wall thickening and mucosal enhancement. Recommend correlation   with urinalysis to assess cystitis.    Elevated right hemidiaphragm with right lower lobe atelectasis.   Nonspecific patchy opacity in the left lower lobe. Recommend clinical   correlation to assess pneumonia and follow-up to assess resolution. 83F PMH COPD (on home 2L 02), bipolar disorder, htn, gerd, hypothyroidism presents from nursing home w/ worsening jaundice over past two weeks.  Also with light light yellow BMs.  Has had worsening epigastric pain as well as worsening rib and back pain .  Denies fevers, chills, chest pain, nausea, vomiting.  She had labwork done at nursing home 9/1/17 which showed bilirubin 12.2 (9.9 direct), w/ , , alk phos 546 so was sent to [facility] for evaluation.  Reports decreased PO intake the past two days 2/2 no appetite. Denies weight loss.      In the ED VS 98.6 92 100/66 16 99%RA  Labs notable for WBC 11.7 Hgb 10.8, .  BUN 13 Cr 0.91 glucose 115. Alb 3.1 Bili 13.5, 12.5 direct. Alk phos 588      CT A/P w/ common bile duct and moderate intrahepatic biliary dilatation, Pancreatic ductal dilatation, may be in part due to distal common bile duct   stone although underlying duodenal/ampullary neoplasm cannot be excluded given soft tissue structure in the second portion of the duodenum. This   can be further characterized on a follow-up MRI/MRCP. Pancreatic ductal dilatation and multiple indeterminate pancreatic lesions, which may be in part due to IPMN and can also be characterized  on a follow-up MRI/MRCP.   Nonspecific mild peripancreatic stranding. Recommend correlation with serum lipase to assess acute pancreatitis.  Indeterminate bilateral adnexal lesions. Borderline endometrium. Endometrial/ovarian neoplasm cannot be excluded. Recommend follow-up.  Bladder wall thickening and mucosal enhancement. Recommend correlation with urinalysis to assess cystitis.  Elevated right hemidiaphragm with right lower lobe atelectasis. Nonspecific patchy opacity in the left lower lobe. Recommend clinical correlation to assess pneumonia and follow-up to assess resolution. 83F PMH COPD (on home 2L 02), bipolar disorder, htn, gerd, hypothyroidism presents from nursing home w/ worsening jaundice over past two weeks.  Also with light light yellow BMs.  Has had worsening epigastric pain as well as worsening rib and back pain .  Denies fevers, chills, chest pain, nausea, vomiting, cough.  She had labwork done at nursing home 9/1/17 which showed bilirubin 12.2 (9.9 direct), w/ , , alk phos 546 so was sent to [facility] for evaluation.  Reports decreased PO intake the past two days 2/2 no appetite. Denies weight loss.      In the ED VS 98.6 92 100/66 16 99%RA  Labs notable for WBC 11.7 Hgb 10.8, .  BUN 13 Cr 0.91 glucose 115. Alb 3.1 Bili 13.5, 12.5 direct. Alk phos 588      CT A/P w/ common bile duct and moderate intrahepatic biliary dilatation, Pancreatic ductal dilatation, may be in part due to distal common bile duct   stone although underlying duodenal/ampullary neoplasm cannot be excluded given soft tissue structure in the second portion of the duodenum. This   can be further characterized on a follow-up MRI/MRCP. Pancreatic ductal dilatation and multiple indeterminate pancreatic lesions, which may be in part due to IPMN and can also be characterized  on a follow-up MRI/MRCP.   Nonspecific mild peripancreatic stranding. Recommend correlation with serum lipase to assess acute pancreatitis.  Indeterminate bilateral adnexal lesions. Borderline endometrium. Endometrial/ovarian neoplasm cannot be excluded. Recommend follow-up.  Bladder wall thickening and mucosal enhancement. Recommend correlation with urinalysis to assess cystitis.  Elevated right hemidiaphragm with right lower lobe atelectasis. Nonspecific patchy opacity in the left lower lobe. Recommend clinical correlation to assess pneumonia and follow-up to assess resolution.    In the ED, given morphine and cefepime x 1.

## 2017-09-02 NOTE — H&P ADULT - PROBLEM SELECTOR PLAN 1
- CT showing CBC, intrahepatic, and pancreatic duct dilation, could be 2/2 distal common bile duct stone vs. pancreatic mass (multiple indeterminate pancreatic masses visualized).  - will need MRCP   - GI consult in the am as will likely need ERCP  - NPO, lipase to r/o pancreatitis as having significant epigastric pain  - IVF  - pain control - CT showing CBC, intrahepatic, and pancreatic duct dilation, could be 2/2 distal common bile duct stone vs. pancreatic mass (multiple indeterminate pancreatic masses visualized).  - will need MRCP to further evaluate  - GI consult in the am as will likely need ERCP  - NPO, lipase to r/o pancreatitis as having significant epigastric pain  - IVF and pain control

## 2017-09-02 NOTE — H&P ADULT - NSHPLABSRESULTS_GEN_ALL_CORE
Labs notable for WBC 11.7 Hgb 10.8, .  BUN 13 Cr 0.91 glucose 115. Alb 3.1 Bili 13.5, 12.5 direct. Alk phos 588      CT A/P w/ common bile duct and moderate intrahepatic biliary dilatation, Pancreatic ductal dilatation, may be in part due to distal common bile duct   stone although underlying duodenal/ampullary neoplasm cannot be excluded given soft tissue structure in the second portion of the duodenum. This   can be further characterized on a follow-up MRI/MRCP. Pancreatic ductal dilatation and multiple indeterminate pancreatic lesions, which may be in part due to IPMN and can also be characterized  on a follow-up MRI/MRCP.   Nonspecific mild peripancreatic stranding. Recommend correlation with serum lipase to assess acute pancreatitis.  Indeterminate bilateral adnexal lesions. Borderline endometrium. Endometrial/ovarian neoplasm cannot be excluded. Recommend follow-up.  Bladder wall thickening and mucosal enhancement. Recommend correlation with urinalysis to assess cystitis.  Elevated right hemidiaphragm with right lower lobe atelectasis. Nonspecific patchy opacity in the left lower lobe. Recommend clinical correlation to assess pneumonia and follow-up to assess resolution.    Labs/imaging personally reviewed  EKG ordered Labs personally reviewed:  notable for WBC 11.7 Hgb 10.8, .  BUN 13 Cr 0.91 glucose 115. Alb 3.1 Bili 13.5, 12.5 direct. Alk phos 588      Imaging personally reviewed:  CT A/P w/ common bile duct and moderate intrahepatic biliary dilatation, Pancreatic ductal dilatation, may be in part due to distal common bile duct   stone although underlying duodenal/ampullary neoplasm cannot be excluded given soft tissue structure in the second portion of the duodenum. This   can be further characterized on a follow-up MRI/MRCP. Pancreatic ductal dilatation and multiple indeterminate pancreatic lesions, which may be in part due to IPMN and can also be characterized  on a follow-up MRI/MRCP.   Nonspecific mild peripancreatic stranding. Recommend correlation with serum lipase to assess acute pancreatitis.  Indeterminate bilateral adnexal lesions. Borderline endometrium. Endometrial/ovarian neoplasm cannot be excluded. Recommend follow-up.  Bladder wall thickening and mucosal enhancement. Recommend correlation with urinalysis to assess cystitis.  Elevated right hemidiaphragm with right lower lobe atelectasis. Nonspecific patchy opacity in the left lower lobe. Recommend clinical correlation to assess pneumonia and follow-up to assess resolution.    EKG tracing personally reviewed

## 2017-09-02 NOTE — CONSULT NOTE ADULT - SUBJECTIVE AND OBJECTIVE BOX
Chief Complaint:  Patient is a 83y old  Female who presents with a chief complaint of epigastric pain, transaminitis, elevated bilirubin on outpatient labs (02 Sep 2017 04:24)      HPI:  83F PMH COPD (on home 2L 02), bipolar disorder, HTN, GERD, hypothyroidism presents from nursing home w/ worsening jaundice over past two weeks.  Patient has had worsening epigastric pain and pain in the back.   She has light yellow BMs. She denies fevers, chills, chest pain, nausea, vomiting, cough.  She reports decreased PO intake the past two days 2/2 no appetite. Denies weight loss.    She had labwork done at nursing home 9/1/17 which showed bilirubin 12.2 (9.9 direct), w/ , , alk phos 546 so was sent to [facility] for evaluation.       In the ED VS 98.6 92 100/66 16 99%RA  Labs notable for WBC 11.7 Hgb 10.8, .  BUN 13 Cr 0.91 glucose 115. Alb 3.1 Bili 13.5, 12.5 direct. Alk phos 588  ;  In the ED, given morphine and cefepime x 1.     Allergies:  penicillin (Unknown)      Home Medications:   Patient Currently Takes Medications as of 02-Sep-2017 04:49 documented in Prescription Writer  · 	Depakote  mg oral tablet, extended release: 1 tab(s) orally once a day (at bedtime), Last Dose Taken:    · 	SEROquel 50 mg oral tablet: 1 tab(s) orally once a day (at bedtime), Last Dose Taken:    · 	SEROquel 25 mg oral tablet: 1 tab(s) orally once a day, Last Dose Taken:    · 	Synthroid 50 mcg (0.05 mg) oral tablet: 1 tab(s) orally once a day, Last Dose Taken:    · 	Depakote 500 mg oral delayed release tablet: 1 tab(s) orally once a day, Last Dose Taken:    · 	Cardizem  mg/24 hours oral capsule, extended release: 1 cap(s) orally once a day, Last Dose Taken:    · 	Lasix 20 mg oral tablet: 1 tab(s) orally once a day, Last Dose Taken:    · 	nystatin 100,000 units/g topical powder: Apply topically to affected area 2 times a day, Last Dose Taken:        Hospital Medications:  HYDROmorphone  Injectable 0.5 milliGRAM(s) IV Push every 4 hours PRN  diVALproex  milliGRAM(s) Oral at bedtime  diltiazem    milliGRAM(s) Oral daily  diVALproex  milliGRAM(s) Oral daily  QUEtiapine 25 milliGRAM(s) Oral daily  QUEtiapine 50 milliGRAM(s) Oral at bedtime  nystatin Powder 1 Application(s) Topical two times a day  levothyroxine 50 MICROGram(s) Oral daily  enoxaparin Injectable 40 milliGRAM(s) SubCutaneous daily  sodium chloride 0.9%. 1000 milliLiter(s) IV Continuous <Continuous>      PMHX/PSHX:  Bipolar disorder  Hx of rheumatoid arthritis  Cellulitis  Schizophrenia  Parkinson's Disease  HTN (Hypertension)  Hypothyroidism  S/P Endoscopy  History of Tonsillectomy  HTN (Hypertension)      Family history:  No pertinent family history in first degree relatives      Social History:     ROS:     General:  No wt loss, fevers, chills, night sweats, fatigue,   Eyes:  Good vision, no reported pain  ENT:  No sore throat, pain, runny nose, dysphagia  CV:  No pain, palpitations, hypo/hypertension  Resp:  No dyspnea, cough, tachypnea, wheezing  GI:  See HPI  :  No pain, bleeding, incontinence, nocturia  Muscle:  No pain, weakness  Neuro:  No weakness, tingling, memory problems  Psych:  No fatigue, insomnia, mood problems, depression  Endocrine:  No polyuria, polydipsia, cold/heat intolerance  Heme:  No petechiae, ecchymosis, easy bruisability  Skin:  No rash, edema      PHYSICAL EXAM:     GENERAL:  Appears stated age, well-groomed, well-nourished, no distress  HEENT:  NC/AT,  conjunctivae clear and pink,  no JVD  CHEST:  Full & symmetric excursion, no increased effort, breath sounds clear  HEART:  Regular rhythm, S1, S2, no murmur/rub/S3/S4, no abdominal bruit, no edema  ABDOMEN:  Soft, non-tender, non-distended, normoactive bowel sounds,  no masses ,  EXTREMITIES:  no cyanosis,clubbing or edema  SKIN:  No rash/erythema/ecchymoses/petechiae/wounds/abscess/warm/dry  NEURO:  Alert, oriented    Vital Signs:  Vital Signs Last 24 Hrs  T(C): 36.7 (02 Sep 2017 04:45), Max: 37.2 (01 Sep 2017 18:19)  T(F): 98 (02 Sep 2017 04:45), Max: 98.9 (01 Sep 2017 18:19)  HR: 85 (02 Sep 2017 04:45) (78 - 95)  BP: 121/78 (02 Sep 2017 04:45) (100/66 - 132/67)  BP(mean): --  RR: 16 (02 Sep 2017 04:45) (16 - 18)  SpO2: 96% (02 Sep 2017 04:45) (90% - 99%)  Daily Height in cm: 156.21 (02 Sep 2017 02:02)    Daily     LABS:                        10.4   12.8  )-----------( 244      ( 02 Sep 2017 07:37 )             33.1     09-02    137  |  94<L>  |  14  ----------------------------<  115<H>  3.8   |  31  |  0.93    Ca    9.7      02 Sep 2017 07:37  Phos  3.0     09-02  Mg     2.4     09-02    TPro  6.2  /  Alb  3.0<L>  /  TBili  15.2<H>  /  DBili  x   /  AST  291<H>  /  ALT  239<H>  /  AlkPhos  626<H>  09-02    LIVER FUNCTIONS - ( 02 Sep 2017 07:37 )  Alb: 3.0 g/dL / Pro: 6.2 g/dL / ALK PHOS: 626 U/L / ALT: 239 U/L RC / AST: 291 U/L / GGT: x           PT/INR - ( 01 Sep 2017 18:47 )   PT: 11.8 sec;   INR: 1.09 ratio         PTT - ( 01 Sep 2017 18:47 )  PTT:30.8 sec    Amylase Serum--      Lipase serum23       Ammonia--      Imaging:      < from: CT Abdomen and Pelvis w/ Oral Cont and w/ IV Cont (09.01.17 @ 21:34) >  FINDINGS:    LOWER CHEST: Elevated right hemidiaphragm with compressive right lower lobe atelectasis. Subsegmental left basilar atelectasis. Patchy opacity   in the left lower lobe.    LIVER: Within normal limits.  BILE DUCTS: Moderate intrahepatic biliary dilatation. Common bile duct dilatation measuring up to 2.3 cm. A 0.4 cm stone in the distal common   bile duct just proximal to the tapered narrowing of the common bile duct.   GALLBLADDER: Distended.  SPLEEN: Within normal limits.  PANCREAS: Pancreatic ductal dilatation measuring up to0.6 cm. Multiple small hypodense lesions scattered throughout the pancreas. Nonspecific   mild proximal peripancreatic stranding.    ADRENALS: Within normal limits.  KIDNEYS/URETERS: No hydronephrosis, hydroureter or significant perinephric stranding. Indeterminate 1.4 x 1.1 cm right interpolar renal   lesion with greater than simple fluid density. Small bilateral renal cysts and subcentimeter hypodense foci, too small to characterize. Small   left renal parapelvic cysts.  BLADDER: Under distended. Wall thickening and mucosal enhancement.  REPRODUCTIVE ORGANS: Borderline endometrium measuring 0.5 cm. Nonspecific small calcifications in bilateral adnexa. Indeterminate hypodense lesions   in the adnexa measuring 2.2 x 1.8 cm on the right and 0.5 x 0.8 cm on the left.    BOWEL: No bowel obstruction. Unremarkable appendix. Soft tissue structure along the medial contour of the second portion of the duodenum measuring   approximate 1.3 x 1.5 cm (2:50-53) with irregular wall extending to the level of the ampulla. Colon diverticulosis.  PERITONEUM: No free air or drainable fluid collection.  VESSELS:  Atherosclerotic change of the abdominal aorta and its branches. Aneurysmal dilatation of the distal right common iliac artery measuring   1.8 cm and 1.7 cm in the left proximal internal iliac artery. Mildly ectatic right proximal iliac artery measuring 1.1 cm.   RETROPERITONEUM: Small lymph nodes, for example, 1.1 x 1.0 cm aortocaval lymph node (2:66).   ABDOMINAL WALL: Small fat-containing umbilical hernia.  BONES: Moderate S-shaped curvature of the spine. Spinal degenerative changes. Grade 1 anterolisthesis of L4 on L5 and L5 on S1. Old bilateral   superior and inferior pubic rami fractures.    IMPRESSION:     Common bile duct and moderate intrahepatic biliary dilatation. Pancreatic ductal dilatation. This may be in part due to distal common bile duct   stone although underlying duodenal/ampullary neoplasm cannot be excluded, given soft tissue structure inthe second portion of the duodenum. This   can be further characterized on a follow-up MRI/MRCP.    Pancreatic ductal dilatation and multiple indeterminate pancreatic lesions, which may be in part due to IPMN and can also be characterized   on a follow-up MRI/MRCP.     Nonspecific mild peripancreatic stranding. Recommend correlation with serum lipase to assess acute pancreatitis.    Indeterminate bilateral adnexal lesions. Borderline endometrium. Endometrial/ovarian neoplasm cannot be excluded. Recommend follow-up.    Bladder wall thickening and mucosal enhancement. Recommend correlation with urinalysis to assess cystitis.    Elevated right hemidiaphragm with right lower lobe atelectasis. Nonspecific patchy opacity in the left lowerlobe. Recommend clinical   correlation to assess pneumonia and follow-up to assess resolution.    < end of copied text >

## 2017-09-02 NOTE — CONSULT NOTE ADULT - ASSESSMENT
Impression  -Elevated bilirubin in the setting of CBD dilation likely 2/2 stone    Plan:  -pending MRCP results Impression  - Choledocholithiasis with double duct sign: elevated bilirubin with dilation of CBD and pancreatic duct and a stone in the CBD; afebrile, WBC 12.8, no signs of acute cholangitis     Plan:  - monitor WBC and fever curve  - please start antibiotics empirically, can start zosyn (please confirm penicillin allergy) vs meropenem  - please obtain an MRI with and without contrast of the abdomen +MRCP  - maintain IV hydration and pain control  - plan for ERCP on Tuesday, NPO after midnight on Monday; if patient spikes a fever, WBC increases: please call us and we will plan for an emergent ERCP  - discuss GOC with the family  - supportive care as per primary team     Thank you for the consult.  GI team will continue to follow.

## 2017-09-02 NOTE — H&P ADULT - NSHPPHYSICALEXAM_GEN_ALL_CORE
Vital Signs Last 24 Hrs  T(C): 36.7 (02 Sep 2017 04:45), Max: 37.2 (01 Sep 2017 18:19)  T(F): 98 (02 Sep 2017 04:45), Max: 98.9 (01 Sep 2017 18:19)  HR: 85 (02 Sep 2017 04:45) (78 - 95)  BP: 121/78 (02 Sep 2017 04:45) (100/66 - 132/67)  BP(mean): --  RR: 16 (02 Sep 2017 04:45) (16 - 18)  SpO2: 96% (02 Sep 2017 04:45) (90% - 99%)    PHYSICAL EXAM:  GENERAL: NAD, well-groomed, well-developed  HEAD:  Atraumatic, Normocephalic  EYES: +scleral icterus   ENMT: dry mucus membranes   NECK: Supple, No JVD  NERVOUS SYSTEM: AOX3, motor and sensation grossly intact in b/l UE and b/l LE  CHEST/LUNG: +crackles at bases, no wheeze   HEART: Regular rate and rhythm; No murmurs, rubs, or gallops, no LE edema   ABDOMEN: +epigastrum tenderness, +RUQ tenderness, Bowel sounds present. Soft, mildly distended. No rebound, no guarding.  EXTREMITIES:  2+ Peripheral Pulses, No clubbing, cyanosis  LYMPH: No lymphadenopathy noted  SKIN: +jaundice

## 2017-09-02 NOTE — PROGRESS NOTE ADULT - ATTENDING COMMENTS
Patient was started on Aztreonam, might  change to a Carbapenem if there is any fever or increase in WBC.

## 2017-09-02 NOTE — H&P ADULT - ASSESSMENT
83F PMH COPD (on home 2L 02), bipolar disorder, htn, gerd, hypothyroidism presents from nursing home w/ worsening jaundice, transaminitis, elevated bilirubin.  Differential includes gallstones vs. obstructive pancreatic mass. 83F PMH COPD (on home 2L 02), bipolar disorder, htn, gerd, hypothyroidism presents from nursing home w/ epigastric pain, worsening jaundice, transaminitis, elevated bilirubin admitted with obstructive jaundice with abd pain. Differential includes gallstones vs. obstructive pancreatic mass.

## 2017-09-02 NOTE — H&P ADULT - NSHPREVIEWOFSYSTEMS_GEN_ALL_CORE
REVIEW OF SYSTEMS:  CONSTITUTIONAL: No fever, weight loss, or fatigue  EYES: No eye pain, visual disturbances, or discharge  ENMT:  No throat pain.  RESPIRATORY: No cough, wheezing, chills or hemoptysis; No shortness of breath  CARDIOVASCULAR: No chest pain, palpitations, dizziness, or leg swelling  GASTROINTESTINAL: +epigastric pain. No nausea, vomiting. +pale stools, +occasional diarrhea  GENITOURINARY: No dysuria, frequency, hematuria, or incontinence  NEUROLOGICAL: No headaches, loss of strength, numbness, or tremors  SKIN: No itching, burning, rashes, or lesions   LYMPH NODES: No enlarged glands  ENDOCRINE: No heat or cold intolerance; No polydipsia or polyuria  MUSCULOSKELETAL:+bilateral calf pain  PSYCHIATRIC: Denies depression, anxiety REVIEW OF SYSTEMS:  CONSTITUTIONAL: No fever, weight loss, or fatigue  EYES: sclera icterus  ENMT:  No throat pain.  RESPIRATORY: No cough, wheezing, chills or hemoptysis; No shortness of breath  CARDIOVASCULAR: No chest pain, palpitations, dizziness, or leg swelling  GASTROINTESTINAL: +epigastric pain. No nausea, vomiting. +pale stools, +occasional diarrhea  GENITOURINARY: No dysuria, frequency, hematuria, or incontinence  NEUROLOGICAL: No headaches, loss of strength, numbness, or tremors  SKIN: jaundice  ENDOCRINE: No heat or cold intolerance; No polydipsia or polyuria  MUSCULOSKELETAL:+bilateral calf pain  PSYCHIATRIC: no agitation

## 2017-09-02 NOTE — H&P ADULT - PROBLEM SELECTOR PLAN 4
- diagnosis of COPD in patient's chart, patient on 2L home 02  - no wheezing on exam, no COPD medications listed in records so unclear diagnosis, patient denies ever smoking   - c/w 02 supplementation

## 2017-09-02 NOTE — PROGRESS NOTE ADULT - SUBJECTIVE AND OBJECTIVE BOX
Patient is a 83y old  Female who presents with a chief complaint of epigastric pain, transaminitis, elevated bilirubin on outpatient labs (02 Sep 2017 04:24)    SUBJECTIVE / OVERNIGHT EVENTS:    83F hx of COPD (on home 2L 02), bipolar disorder, htn, gerd, hypothyroidism who presented from NH w/ epigastric pain as well as jaundice x 2 weeks. Pt also endorses pale stool, slightly dark urine and reduced po intake but denies any nausea/vomiting, diarrhea, weight loss, fevers/chills, chest pain. Pt hemodynamically stable on admission and labs were notable for elevated bilirubin (13.5), AST/ALT: 334/250 and alk phos (588). A CT a/p showed common bile duct and moderate intrahepatic biliary dilatation, pancreatic ductal dilatation, mild peripancreatic stranding, bladder wall thickening, RLL atelectasis as well as bilateral adnexal lesions.     She received Cefepime as well as IVF.     MEDICATIONS  (STANDING):  diVALproex  milliGRAM(s) Oral at bedtime  diltiazem    milliGRAM(s) Oral daily  diVALproex  milliGRAM(s) Oral daily  QUEtiapine 25 milliGRAM(s) Oral daily  QUEtiapine 50 milliGRAM(s) Oral at bedtime  nystatin Powder 1 Application(s) Topical two times a day  levothyroxine 50 MICROGram(s) Oral daily  enoxaparin Injectable 40 milliGRAM(s) SubCutaneous daily  sodium chloride 0.9%. 1000 milliLiter(s) (100 mL/Hr) IV Continuous <Continuous>    MEDICATIONS  (PRN):  HYDROmorphone  Injectable 0.5 milliGRAM(s) IV Push every 4 hours PRN moderate to severe pain (4-10)      Vital Signs Last 24 Hrs  T(C): 36.4 (02 Sep 2017 09:28), Max: 37.2 (01 Sep 2017 18:19)  T(F): 97.5 (02 Sep 2017 09:28), Max: 98.9 (01 Sep 2017 18:19)  HR: 80 (02 Sep 2017 09:28) (78 - 95)  BP: 136/85 (02 Sep 2017 09:28) (100/66 - 136/85)  BP(mean): --  RR: 16 (02 Sep 2017 09:28) (16 - 18)  SpO2: 95% (02 Sep 2017 09:28) (90% - 99%)  CAPILLARY BLOOD GLUCOSE      I&O's Summary    01 Sep 2017 07:01  -  02 Sep 2017 07:00  --------------------------------------------------------  IN: 0 mL / OUT: 0 mL / NET: 0 mL    PHYSICAL EXAM  GENERAL: NAD, resting comfortably   HEAD:  Atraumatic, Normocephalic  EYES: EOMI, PERRLA, conjunctiva and sclera clear  NECK: Supple, No JVD  CHEST/LUNG: Clear to auscultation bilaterally; No wheeze  HEART: Regular rate and rhythm; No murmurs, rubs, or gallops  ABDOMEN: Soft, mild tenderness in epigastric region, no rebound or guarding + BS   EXTREMITIES:  2+ Peripheral Pulses, No clubbing, cyanosis, or edema  PSYCH: AAOx3  SKIN: No rashes or lesions    LABS:                        10.4   12.8  )-----------( 244      ( 02 Sep 2017 07:37 )             33.1     09-02    137  |  94<L>  |  14  ----------------------------<  115<H>  3.8   |  31  |  0.93    Ca    9.7      02 Sep 2017 07:37  Phos  3.0     09-02  Mg     2.4     09-02    TPro  6.2  /  Alb  3.0<L>  /  TBili  15.2<H>  /  DBili  x   /  AST  291<H>  /  ALT  239<H>  /  AlkPhos  626<H>  09-02    PT/INR - ( 01 Sep 2017 18:47 )   PT: 11.8 sec;   INR: 1.09 ratio         PTT - ( 01 Sep 2017 18:47 )  PTT:30.8 sec      RADIOLOGY & ADDITIONAL TESTS:    < from: CT Abdomen and Pelvis w/ Oral Cont and w/ IV Cont (09.01.17 @ 21:34) >  Common bile duct and moderate intrahepatic biliary dilatation. Pancreatic   ductal dilatation. This may be in part due to distal common bile duct   stone although underlying duodenal/ampullary neoplasm cannot be excluded,   given soft tissue structure inthe second portion of the duodenum. This   can be further characterized on a follow-up MRI/MRCP.    Pancreatic ductal dilatation and multiple indeterminate pancreatic   lesions, which may be in part due to IPMN and can also be characterized   on a follow-up MRI/MRCP.     Nonspecific mild peripancreatic stranding. Recommend correlation with   serum lipase to assess acute pancreatitis.    Indeterminate bilateral adnexal lesions. Borderline endometrium.   Endometrial/ovarian neoplasm cannot be excluded. Recommend follow-up.    Bladder wall thickening and mucosal enhancement. Recommend correlation   with urinalysis to assess cystitis.    Elevated right hemidiaphragm with right lower lobe atelectasis.   Nonspecific patchy opacity in the left lowerlobe. Recommend clinical   correlation to assess pneumonia and follow-up to assess resolution.    These findings were discussed with Dr. Ibarra at 9/1/2017 10:32 PM by   Dr. Bruno with read back confirmation.             < end of copied text >

## 2017-09-03 NOTE — DISCHARGE NOTE ADULT - PLAN OF CARE
Resolution You were admitted because you were having worsening jaundice and abdominal pain, You were found to have an obstructed duct, and the gastroenterologists performed an ERCP to relieve the obstruction Ongoing Management Please continue with your home O2 (2L). Please continue with your home medications.

## 2017-09-03 NOTE — PROGRESS NOTE ADULT - PROBLEM SELECTOR PLAN 1
- CT a/p shows common bile duct, intrahepatic, as well as pancreatic duct dilation, may be 2/2 distal common bile duct stone vs. pancreatic mass (multiple indeterminate pancreatic masses visualized).  - f/u MRCP   - GI consulted- plan for ERCP on tuesday; if pt acutely decompensates, will do emergent ERCP  - continue IVF and pain control as needed - CT a/p shows common bile duct, intrahepatic, as well as pancreatic duct dilation, may be 2/2 distal common bile duct stone vs. pancreatic mass (multiple indeterminate pancreatic masses visualized).  - f/u MRCP   - GI consulted- plan for ERCP on tuesday, will contact GI today for ERCP as patient's mentation has acutely worsened  - continue IVF and pain control as needed

## 2017-09-03 NOTE — CONSULT NOTE ADULT - ASSESSMENT
83F PMH COPD (on home 2L 02), bipolar disorder, htn, gerd, hypothyroidism presented from nursing home with worsening jaundice over past two weeks. Evidence of CBD obstruction on CT scan also with evidence of bladder wall thickening. U/A also with pyuria. Would cover empirically for both hepatobiliary sources and urinary sources. PCN allergy is vague but would make switch to Cefepime/Flagyl at this point. Can add vancomycin if patient becomes clinically unstable  --Stop Meropenem/Vanco  --Start Cefepime 2,000 mg IV Q12H  --Start Flagyl 500 mg IV Q8H  --Obtain blood and urine cultures prior to new antibiotics if possible.   --GI for management of CBD obstruction

## 2017-09-03 NOTE — DISCHARGE NOTE ADULT - CARE PLAN
Principal Discharge DX:	Common bile duct (CBD) obstruction  Goal:	Resolution  Instructions for follow-up, activity and diet:	You were admitted because you were having worsening jaundice and abdominal pain, You were found to have an obstructed duct, and the gastroenterologists performed an ERCP to relieve the obstruction  Secondary Diagnosis:	COPD (chronic obstructive pulmonary disease)  Goal:	Ongoing Management  Instructions for follow-up, activity and diet:	Please continue with your home O2 (2L).  Secondary Diagnosis:	HTN (Hypertension)  Goal:	Ongoing Management  Instructions for follow-up, activity and diet:	Please continue with your home medications.  Secondary Diagnosis:	Ovarian mass  Goal:	Ongoing Management  Instructions for follow-up, activity and diet:	Please continue with your home medications.  Secondary Diagnosis:	Bladder wall thickening  Goal:	Ongoing Management  Instructions for follow-up, activity and diet:	Please continue with your home medications.  Secondary Diagnosis:	Bipolar disorder  Goal:	Ongoing Management  Instructions for follow-up, activity and diet:	Please continue with your home medications.

## 2017-09-03 NOTE — DISCHARGE NOTE ADULT - HOSPITAL COURSE
83F PMH COPD (on home 2L 02), bipolar disorder, HTN, GERD, hypothyroidism presents from nursing home w/ worsening jaundice over past two weeks.  Also with light light yellow BMs.  Has had worsening epigastric pain as well as worsening rib and back pain .  Denies fevers, chills, chest pain, nausea, vomiting, cough.  She had labwork done at nursing home 9/1/17 which showed bilirubin 12.2 (9.9 direct), w/ , , alk phos 546 so was sent to [facility] for evaluation.  Reports decreased PO intake the past two days 2/2 no appetite. Denies weight loss.      During this admission, patient was found to have an elevated bilirubin to 14.9 and elevated LFTs and Alk phos. Pt underwent a CT abdomen which showed  common bile duct and moderate intrahepatic biliary dilatation and pancreatic  which may be secondary to stone or soft tissue. GI was consulted for further management, and they recommended non emergent ERCP. CT abd also showed indeterminant adnexal lesions, which were further evaluated with US which showed__________________.  Patient was found to be less responsive on HD 2, and underwent a head CT which showed no acute findings

## 2017-09-03 NOTE — CONSULT NOTE ADULT - SUBJECTIVE AND OBJECTIVE BOX
HPI:  83F PMH COPD (on home 2L 02), bipolar disorder, htn, gerd, hypothyroidism presents from nursing home w/ worsening jaundice over past two weeks.  Also with light light yellow BMs.  Has had worsening epigastric pain as well as worsening rib and back pain .  Denies fevers, chills, chest pain, nausea, vomiting, cough.  She had labwork done at nursing home 9/1/17 which showed bilirubin 12.2 (9.9 direct), w/ , , alk phos 546 so was sent to [facility] for evaluation.  Reports decreased PO intake the past two days 2/2 no appetite. Denies weight loss.      Afebrile in the ED, normotensive and not tachycardic. Labs notable for WBC 11.7 Hgb 10.8, .  BUN 13 Cr 0.91 glucose 115. Alb 3.1 Bili 13.5, 12.5 direct. Alk phos 588  . Given a dose of Cefepime in the ED. She was switched to Aztreonam after admission. CT Abdomen and Pelvis revealed CBD obstruction and pancreatic ductal dilatation.  Encephalopathy worsened so she was switched to Meropenem/Vanco on 8/3.     PAST MEDICAL & SURGICAL HISTORY:  Bipolar disorder  Hx of rheumatoid arthritis  Cellulitis: b/l lower extremties  Schizophrenia  Parkinson's Disease  HTN (Hypertension)  Hypothyroidism  S/P Endoscopy: previous food impactions with interventions ....not sure when  History of Tonsillectomy      Allergies  penicillin (Unknown)        ANTIMICROBIALS:  meropenem IVPB    vancomycin  IVPB 1000 once  meropenem IVPB 1000 once  meropenem IVPB 1000 every 8 hours      OTHER MEDS: MEDICATIONS  (STANDING):  HYDROmorphone  Injectable 0.5 every 4 hours PRN  diVALproex  at bedtime  diltiazem    daily  diVALproex  daily  QUEtiapine 25 daily  QUEtiapine 50 at bedtime  levothyroxine 50 daily  enoxaparin Injectable 40 daily      SOCIAL HISTORY:  [ ] etoh [ ] tobacco [ ] former smoker [ ] IVDU    FAMILY HISTORY:  No pertinent family history in first degree relatives      REVIEW OF SYSTEMS  [  ] ROS unobtainable because:    [  ] All other systems negative except as noted below:	    Constitutional:  [ ] fever [ ] weight loss  Skin:  [ ] rash [ ] phlebitis	  Eyes: [ ] icterus [ ] inflammation	  ENMT: [ ] discharge [ ] thrush [ ] ulcers [ ] exudates  Respiratory: [ ] dyspnea [ ] hemoptysis [ ] cough [ ] sputum	  Cardiovascular:  [ ] chest pain [ ] palpitations [ ] edema	  Gastrointestinal:  [ ] nausea [ ] vomiting [ ] diarrhea [ ] constipation [ ] pain	  Genitourinary:  [ ] dysuria [ ] frequency [ ] hematuria [ ] discharge [ ] flank pain  Musculoskeletal:  [ ] myalgias [ ] arthralgias [ ] arthritis	  Neurological:  [ ] headache [ ] seizures	  Psychiatric:  [ ] anxiety [ ] depression	  Hematology/Lymphatics:  [ ] lymphadenopathy  Endocrine:  [ ] adrenal [ ] thyroid  Allergic/Immunologic:	 [ ] transplant [ ] seasonal    Vital Signs Last 24 Hrs  T(F): 98.2 (09-03-17 @ 09:06), Max: 98.9 (09-01-17 @ 18:19)    Vital Signs Last 24 Hrs  HR: 87 (09-03-17 @ 09:06) (69 - 87)  BP: 106/70 (09-03-17 @ 09:06) (92/58 - 118/77)  RR: 16 (09-03-17 @ 09:06)  SpO2: 98% (09-03-17 @ 09:06) (97% - 100%)  Wt(kg): --    PHYSICAL EXAM:  General: non-toxic  HEAD/EYES: anicteric, PERRL  ENT:  supple  Cardiovascular:   S1, S2  Respiratory:  clear bilaterally  GI:  soft, non-tender, normal bowel sounds  :  no CVA tenderness   Musculoskeletal:  no synovitis  Neurologic:  grossly non-focal  Skin:  no rash  Lymph: no lymphadenopathy  Psychiatric:  appropriate affect  Vascular:  no phlebitis                                9.7    7.6   )-----------( 226      ( 03 Sep 2017 06:35 )             29.8       09-03    141  |  98  |  19  ----------------------------<  92  4.2   |  35<H>  |  0.98    Ca    9.5      03 Sep 2017 06:35  Phos  2.9     09-03  Mg     2.3     09-03    TPro  5.5<L>  /  Alb  2.7<L>  /  TBili  14.5<H>  /  DBili  12.3<H>  /  AST  179<H>  /  ALT  177<H>  /  AlkPhos  621<H>  09-03      Urinalysis Basic - ( 02 Sep 2017 11:57 )    Color: Brown / Appearance: Turbid / SG: >1.030 / pH: x  Gluc: x / Ketone: Negative  / Bili: Large / Urobili: 1   Blood: x / Protein: 30 mg/dL / Nitrite: Negative   Leuk Esterase: Large / RBC: 25-50 /HPF / WBC >50 /HPF   Sq Epi: x / Non Sq Epi: x / Bacteria: x        MICROBIOLOGY:    None to review    RADIOLOGY:    EXAM:  CT ABDOMEN AND PELVIS OC IC                        PROCEDURE DATE:  09/01/2017    Common bile duct and moderate intrahepatic biliary dilatation. Pancreatic   ductal dilatation. This may be in part due to distal common bile duct   stone although underlying duodenal/ampullary neoplasm cannot be excluded,   given soft tissue structure in the second portion of the duodenum. This   can be further characterized on a follow-up MRI/MRCP.    Pancreatic ductal dilatation and multiple indeterminate pancreatic   lesions, which may be in part due to IPMN and can also be characterized   on a follow-up MRI/MRCP.     Nonspecific mild peripancreatic stranding. Recommend correlation with   serum lipase to assess acute pancreatitis.    Indeterminate bilateral adnexal lesions. Borderline endometrium.   Endometrial/ovarian neoplasm cannot be excluded. Recommend follow-up.    Bladder wall thickening and mucosal enhancement. Recommend correlation   with urinalysis to assess cystitis.    Elevated right hemidiaphragm with right lower lobe atelectasis.   Nonspecific patchy opacity in the left lower lobe. Recommend clinical   correlation to assess pneumonia and follow-up to assess resolution.    These findings were discussed with Dr. Ibarra at 9/1/2017 10:32 PM by   Dr. Bruno with read back confirmation. HPI:  83F PMH COPD (on home 2L 02), bipolar disorder, htn, gerd, hypothyroidism presents from nursing home w/ worsening jaundice over past two weeks.  Also with light light yellow BMs.  Has had worsening epigastric pain as well as worsening rib and back pain .  Denies fevers, chills, chest pain, nausea, vomiting, cough.  She had labwork done at nursing home 9/1/17 which showed bilirubin 12.2 (9.9 direct), w/ , , alk phos 546 so was sent to [facility] for evaluation.  Decreased PO intake the past two days 2/2 no appetite. Denies weight loss.      Afebrile in the ED, normotensive and not tachycardic. Labs notable for WBC 11.7 Hgb 10.8, .  BUN 13 Cr 0.91 glucose 115. Alb 3.1 Bili 13.5, 12.5 direct. Alk phos 588  . Given a dose of Cefepime in the ED. She was switched to Aztreonam after admission. CT Abdomen and Pelvis revealed CBD obstruction and pancreatic ductal dilatation.  Encephalopathy worsened so she was switched to Meropenem/Vanco on 8/3.     PAST MEDICAL & SURGICAL HISTORY:  Bipolar disorder  Hx of rheumatoid arthritis  Cellulitis: b/l lower extremties  Schizophrenia  Parkinson's Disease  HTN (Hypertension)  Hypothyroidism  S/P Endoscopy: previous food impactions with interventions ....not sure when  History of Tonsillectomy    Allergies  penicillin (Unknown)    ANTIMICROBIALS:  meropenem IVPB    vancomycin  IVPB 1000 once  meropenem IVPB 1000 once  meropenem IVPB 1000 every 8 hours    OTHER MEDS: MEDICATIONS  (STANDING):  HYDROmorphone  Injectable 0.5 every 4 hours PRN  diVALproex  at bedtime  diltiazem    daily  diVALproex  daily  QUEtiapine 25 daily  QUEtiapine 50 at bedtime  levothyroxine 50 daily  enoxaparin Injectable 40 daily      SOCIAL HISTORY:  Unable to obtain given patient's encephalopathy    FAMILY HISTORY:  No pertinent family history in first degree relatives      REVIEW OF SYSTEMS  [ x ] ROS unobtainable because:  Unable to obtain given patient's encephalopathy    [  ] All other systems negative except as noted below:	    Constitutional:  [ ] fever [ ] weight loss  Skin:  [ ] rash [ ] phlebitis	  Eyes: [ ] icterus [ ] inflammation	  ENMT: [ ] discharge [ ] thrush [ ] ulcers [ ] exudates  Respiratory: [ ] dyspnea [ ] hemoptysis [ ] cough [ ] sputum	  Cardiovascular:  [ ] chest pain [ ] palpitations [ ] edema	  Gastrointestinal:  [ ] nausea [ ] vomiting [ ] diarrhea [ ] constipation [ ] pain	  Genitourinary:  [ ] dysuria [ ] frequency [ ] hematuria [ ] discharge [ ] flank pain  Musculoskeletal:  [ ] myalgias [ ] arthralgias [ ] arthritis	  Neurological:  [ ] headache [ ] seizures	  Psychiatric:  [ ] anxiety [ ] depression	  Hematology/Lymphatics:  [ ] lymphadenopathy  Endocrine:  [ ] adrenal [ ] thyroid  Allergic/Immunologic:	 [ ] transplant [ ] seasonal    Vital Signs Last 24 Hrs  T(F): 98.2 (09-03-17 @ 09:06), Max: 98.9 (09-01-17 @ 18:19)    Vital Signs Last 24 Hrs  HR: 87 (09-03-17 @ 09:06) (69 - 87)  BP: 106/70 (09-03-17 @ 09:06) (92/58 - 118/77)  RR: 16 (09-03-17 @ 09:06)  SpO2: 98% (09-03-17 @ 09:06) (97% - 100%)  Wt(kg): --    PHYSICAL EXAMINATION:  General: Somnolent and oriented only to self, NAD  HEENT: PERRL, EOMI, No subconjunctival hemorrhages, Oropharynx Clear, MMM  Neck: Supple, No JAYSON  Cardiac: RRR, No M/R/G  Resp: CTAB, No Wh/Rh/Ra  Abdomen: NBS, Diffuse abdominal tenderness worst in RUQ and suprapubic area, No HSM, No rigidity or guarding  MSK: No LE edema. No stigmata of IE. No evidence of phlebitis. No evidence of synovitis.  Back: No CVA Tenderness  Skin: No rashes or lesions. Skin is warm and dry to the touch.   Neuro: Somnolent and oriented only to self,. CN 2-12 Grossly intact. Moves all four extremities spontaneously.  Psych: Calm, Pleasant, Cooperative                          9.7    7.6   )-----------( 226      ( 03 Sep 2017 06:35 )             29.8       09-03    141  |  98  |  19  ----------------------------<  92  4.2   |  35<H>  |  0.98    Ca    9.5      03 Sep 2017 06:35  Phos  2.9     09-03  Mg     2.3     09-03    TPro  5.5<L>  /  Alb  2.7<L>  /  TBili  14.5<H>  /  DBili  12.3<H>  /  AST  179<H>  /  ALT  177<H>  /  AlkPhos  621<H>  09-03      Urinalysis Basic - ( 02 Sep 2017 11:57 )    Color: Brown / Appearance: Turbid / SG: >1.030 / pH: x  Gluc: x / Ketone: Negative  / Bili: Large / Urobili: 1   Blood: x / Protein: 30 mg/dL / Nitrite: Negative   Leuk Esterase: Large / RBC: 25-50 /HPF / WBC >50 /HPF   Sq Epi: x / Non Sq Epi: x / Bacteria: x        MICROBIOLOGY:    None to review    RADIOLOGY:    EXAM:  CT ABDOMEN AND PELVIS OC IC                        PROCEDURE DATE:  09/01/2017    Common bile duct and moderate intrahepatic biliary dilatation. Pancreatic   ductal dilatation. This may be in part due to distal common bile duct   stone although underlying duodenal/ampullary neoplasm cannot be excluded,   given soft tissue structure in the second portion of the duodenum. This   can be further characterized on a follow-up MRI/MRCP.    Pancreatic ductal dilatation and multiple indeterminate pancreatic   lesions, which may be in part due to IPMN and can also be characterized   on a follow-up MRI/MRCP.     Nonspecific mild peripancreatic stranding. Recommend correlation with   serum lipase to assess acute pancreatitis.    Indeterminate bilateral adnexal lesions. Borderline endometrium.   Endometrial/ovarian neoplasm cannot be excluded. Recommend follow-up.    Bladder wall thickening and mucosal enhancement. Recommend correlation   with urinalysis to assess cystitis.    Elevated right hemidiaphragm with right lower lobe atelectasis.   Nonspecific patchy opacity in the left lower lobe. Recommend clinical   correlation to assess pneumonia and follow-up to assess resolution.    These findings were discussed with Dr. Ibarra at 9/1/2017 10:32 PM by   Dr. Bruno with read back confirmation.

## 2017-09-03 NOTE — PROGRESS NOTE ADULT - ATTENDING COMMENTS
Patient was found this AM with decreased mentation with no reported fever.  Patient was less responsive and closed eyes after being awakened.  No change in Phys exam today .  Acute encephalopathy most likely metabolic sec to possible cholangitis in setting on Obstructive jaundice or UTI.  ID was consulted and recommended Cefepime with Flagyl.  One dose of Vanco was given today , cont IVF with isotonic solution , f/up urine CX , blood culture drawn today. Patient was found this AM with decreased mentation with no reported fever.  Patient was less responsive and closed eyes after being awakened.  No change in Phys exam today .  Acute encephalopathy most likely metabolic sec to possible cholangitis in setting on Obstructive jaundice or UTI.  ID was consulted and recommended Cefepime with Flagyl.  One dose of Vanco was given today , cont IVF with isotonic solution , f/up urine CX , blood culture drawn today.  F/up MRCP once done , patient will eventually need ERCP for decompression.

## 2017-09-03 NOTE — PROGRESS NOTE ADULT - SUBJECTIVE AND OBJECTIVE BOX
S: Patient seen and examined at bedside. Patient has no acute complaints overnight.          HYDROmorphone  Injectable 0.5 milliGRAM(s) IV Push every 4 hours PRN  diVALproex  milliGRAM(s) Oral at bedtime  diltiazem    milliGRAM(s) Oral daily  diVALproex  milliGRAM(s) Oral daily  QUEtiapine 25 milliGRAM(s) Oral daily  QUEtiapine 50 milliGRAM(s) Oral at bedtime  nystatin Powder 1 Application(s) Topical two times a day  levothyroxine 50 MICROGram(s) Oral daily  enoxaparin Injectable 40 milliGRAM(s) SubCutaneous daily  aztreonam  IVPB   IV Intermittent   aztreonam  IVPB 1000 milliGRAM(s) IV Intermittent every 8 hours  lactated ringers. 1000 milliLiter(s) IV Continuous <Continuous>        REVIEW OF SYSTEMS:  Vital Signs Last 24 Hrs  T(C): 36.7 (03 Sep 2017 05:01), Max: 36.8 (03 Sep 2017 01:32)  T(F): 98.1 (03 Sep 2017 05:01), Max: 98.3 (03 Sep 2017 01:32)  HR: 80 (03 Sep 2017 05:01) (69 - 80)  BP: 102/66 (03 Sep 2017 05:01) (92/58 - 136/85)  BP(mean): --  RR: 16 (03 Sep 2017 05:01) (16 - 18)  SpO2: 100% (03 Sep 2017 05:01) (95% - 100%)    PHYSICAL EXAM:  General: A/ox 3, No acute Distress  Neck: Supple, NO JVD  Cardiac: S1 S2, No M/R/G  Pulmonary: CTAB, Breathing unlabored, No Rhonchi/Rales/Wheezing  Abdomen: Soft, Non -tender, +BS   Extremities: No Rashes, No edema  Neuro: A/o x 3, No focal deficits  Psch: normal mood , normal affect    LABS:  cret                        9.7    7.6   )-----------( 226      ( 03 Sep 2017 06:35 )             29.8     09-03    141  |  98  |  19  ----------------------------<  92  4.2   |  35<H>  |  0.98    Ca    9.5      03 Sep 2017 06:35  Phos  2.9     09-03  Mg     2.3     09-03    TPro  5.5<L>  /  Alb  2.7<L>  /  TBili  14.5<H>  /  DBili  x   /  AST  179<H>  /  ALT  177<H>  /  AlkPhos  621<H>  09-03    PT/INR - ( 01 Sep 2017 18:47 )   PT: 11.8 sec;   INR: 1.09 ratio         PTT - ( 01 Sep 2017 18:47 )  PTT:30.8 sec  Daily     Daily   I&O's Detail    02 Sep 2017 07:01  -  03 Sep 2017 07:00  --------------------------------------------------------  IN:    IV PiggyBack: 150 mL    lactated ringers.: 1300 mL    sodium chloride 0.9%: 900 mL  Total IN: 2350 mL    OUT:    Voided: 120 mL  Total OUT: 120 mL    Total NET: 2230 mL S: Patient seen and examined at bedside. Patient has decreased mentation, AxOx2, more difficult to arouse this morning. Will contact GI asap for possible ERCP today.        HYDROmorphone  Injectable 0.5 milliGRAM(s) IV Push every 4 hours PRN  diVALproex  milliGRAM(s) Oral at bedtime  diltiazem    milliGRAM(s) Oral daily  diVALproex  milliGRAM(s) Oral daily  QUEtiapine 25 milliGRAM(s) Oral daily  QUEtiapine 50 milliGRAM(s) Oral at bedtime  nystatin Powder 1 Application(s) Topical two times a day  levothyroxine 50 MICROGram(s) Oral daily  enoxaparin Injectable 40 milliGRAM(s) SubCutaneous daily  aztreonam  IVPB   IV Intermittent   aztreonam  IVPB 1000 milliGRAM(s) IV Intermittent every 8 hours  lactated ringers. 1000 milliLiter(s) IV Continuous <Continuous>        REVIEW OF SYSTEMS:  Vital Signs Last 24 Hrs  T(C): 36.7 (03 Sep 2017 05:01), Max: 36.8 (03 Sep 2017 01:32)  T(F): 98.1 (03 Sep 2017 05:01), Max: 98.3 (03 Sep 2017 01:32)  HR: 80 (03 Sep 2017 05:01) (69 - 80)  BP: 102/66 (03 Sep 2017 05:01) (92/58 - 136/85)  BP(mean): --  RR: 16 (03 Sep 2017 05:01) (16 - 18)  SpO2: 100% (03 Sep 2017 05:01) (95% - 100%)    PHYSICAL EXAM:  General: A/ox 2, No acute Distress  Neck: Supple, NO JVD  Cardiac: S1 S2, No M/R/G  Pulmonary: CTAB, Breathing unlabored, No Rhonchi/Rales/Wheezing  Abdomen: Soft, Non -tender, +BS   Extremities: No Rashes, No edema  Neuro: A/o x 3=2, No focal deficits  Psch: normal mood , normal affect    LABS:  cret                        9.7    7.6   )-----------( 226      ( 03 Sep 2017 06:35 )             29.8     09-03    141  |  98  |  19  ----------------------------<  92  4.2   |  35<H>  |  0.98    Ca    9.5      03 Sep 2017 06:35  Phos  2.9     09-03  Mg     2.3     09-03    TPro  5.5<L>  /  Alb  2.7<L>  /  TBili  14.5<H>  /  DBili  x   /  AST  179<H>  /  ALT  177<H>  /  AlkPhos  621<H>  09-03    PT/INR - ( 01 Sep 2017 18:47 )   PT: 11.8 sec;   INR: 1.09 ratio         PTT - ( 01 Sep 2017 18:47 )  PTT:30.8 sec  Daily     Daily   I&O's Detail    02 Sep 2017 07:01  -  03 Sep 2017 07:00  --------------------------------------------------------  IN:    IV PiggyBack: 150 mL    lactated ringers.: 1300 mL    sodium chloride 0.9%: 900 mL  Total IN: 2350 mL    OUT:    Voided: 120 mL  Total OUT: 120 mL    Total NET: 2230 mL S: Patient seen and examined at bedside. Patient has decreased mentation, AxOx2, more difficult to arouse this morning. Will contact GI asap for possible ERCP today. No fever reported by nurses.          HYDROmorphone  Injectable 0.5 milliGRAM(s) IV Push every 4 hours PRN  diVALproex  milliGRAM(s) Oral at bedtime  diltiazem    milliGRAM(s) Oral daily  diVALproex  milliGRAM(s) Oral daily  QUEtiapine 25 milliGRAM(s) Oral daily  QUEtiapine 50 milliGRAM(s) Oral at bedtime  nystatin Powder 1 Application(s) Topical two times a day  levothyroxine 50 MICROGram(s) Oral daily  enoxaparin Injectable 40 milliGRAM(s) SubCutaneous daily  aztreonam  IVPB   IV Intermittent   aztreonam  IVPB 1000 milliGRAM(s) IV Intermittent every 8 hours  lactated ringers. 1000 milliLiter(s) IV Continuous <Continuous>        REVIEW OF SYSTEMS:  Vital Signs Last 24 Hrs  T(C): 36.7 (03 Sep 2017 05:01), Max: 36.8 (03 Sep 2017 01:32)  T(F): 98.1 (03 Sep 2017 05:01), Max: 98.3 (03 Sep 2017 01:32)  HR: 80 (03 Sep 2017 05:01) (69 - 80)  BP: 102/66 (03 Sep 2017 05:01) (92/58 - 136/85)  BP(mean): --  RR: 16 (03 Sep 2017 05:01) (16 - 18)  SpO2: 100% (03 Sep 2017 05:01) (95% - 100%)    PHYSICAL EXAM:  General: A/ox 2, No acute Distress  Neck: Supple, NO JVD  Cardiac: S1 S2, No M/R/G  Pulmonary: CTAB, Breathing unlabored, No Rhonchi/Rales/Wheezing  Abdomen: Soft, pos -tender to deep palpation, +BS   Extremities: No Rashes, No edema  Neuro: A/o x 3=2, No focal deficits  Psch: normal mood , normal affect  Skin : pos icterus     LABS:  cret                        9.7    7.6   )-----------( 226      ( 03 Sep 2017 06:35 )             29.8     09-03    141  |  98  |  19  ----------------------------<  92  4.2   |  35<H>  |  0.98    Ca    9.5      03 Sep 2017 06:35  Phos  2.9     09-03  Mg     2.3     09-03    TPro  5.5<L>  /  Alb  2.7<L>  /  TBili  14.5<H>  /  DBili  x   /  AST  179<H>  /  ALT  177<H>  /  AlkPhos  621<H>  09-03    PT/INR - ( 01 Sep 2017 18:47 )   PT: 11.8 sec;   INR: 1.09 ratio         PTT - ( 01 Sep 2017 18:47 )  PTT:30.8 sec  Daily     Daily   I&O's Detail    02 Sep 2017 07:01  -  03 Sep 2017 07:00  --------------------------------------------------------  IN:    IV PiggyBack: 150 mL    lactated ringers.: 1300 mL    sodium chloride 0.9%: 900 mL  Total IN: 2350 mL    OUT:    Voided: 120 mL  Total OUT: 120 mL    Total NET: 2230 mL

## 2017-09-03 NOTE — DISCHARGE NOTE ADULT - SECONDARY DIAGNOSIS.
COPD (chronic obstructive pulmonary disease) HTN (Hypertension) Ovarian mass Bladder wall thickening Bipolar disorder

## 2017-09-04 NOTE — PROGRESS NOTE ADULT - SUBJECTIVE AND OBJECTIVE BOX
INFECTIOUS DISEASES FOLLOW UP--Harry Vu MD  Pager [phone]    This is a follow up note for this  83y Female with  Calculus of bile duct ---uncertain if she has cholangitis but WBC count down on cefepime and flagyl empirically.  cultures pending    Further ROS:  quite lethargic    Allergies  penicillin (Unknown)--tolerating cefepime      ANTIBIOTICS/RELEVANT:  antimicrobials  metroNIDAZOLE    Tablet 500 milliGRAM(s) Oral every 8 hours  cefepime  IVPB   IV Intermittent   cefepime  IVPB 1000 milliGRAM(s) IV Intermittent every 8 hours    OTHER:  HYDROmorphone  Injectable 0.5 milliGRAM(s) IV Push every 4 hours PRN  diVALproex  milliGRAM(s) Oral at bedtime  diltiazem    milliGRAM(s) Oral daily  diVALproex  milliGRAM(s) Oral daily  QUEtiapine 25 milliGRAM(s) Oral daily  QUEtiapine 50 milliGRAM(s) Oral at bedtime  nystatin Powder 1 Application(s) Topical two times a day  levothyroxine 50 MICROGram(s) Oral daily  enoxaparin Injectable 40 milliGRAM(s) SubCutaneous daily  lactated ringers. 1000 milliLiter(s) IV Continuous <Continuous>  docusate sodium 100 milliGRAM(s) Oral daily      Objective:  Vital Signs Last 24 Hrs  T(C): 36.9 (04 Sep 2017 04:38), Max: 37.1 (03 Sep 2017 16:26)  T(F): 98.4 (04 Sep 2017 04:38), Max: 98.7 (03 Sep 2017 16:26)  HR: 86 (04 Sep 2017 04:38) (82 - 89)  BP: 104/67 (04 Sep 2017 04:38) (99/66 - 104/67)  BP(mean): --  RR: 18 (04 Sep 2017 04:38) (16 - 18)  SpO2: 97% (04 Sep 2017 04:38) (97% - 99%)    PHYSICAL EXAM:  Constitutional:no acute distress  Eyes:WM, EOMI  Ear/Nose/Throat: no oral lesions, 	  Respiratory: clear BL  Cardiovascular: S1S2  Gastrointestinal:soft, (+) BS, no tenderness  Extremities:no e/e/c  No Lymphadenopathy  IV sites not inflammed.    LABS:                        8.5    6.3   )-----------( 222      ( 04 Sep 2017 06:53 )             25.9     09-    140  |  101  |  19  ----------------------------<  102<H>  4.2   |  32<H>  |  0.91    Ca    9.0      04 Sep 2017 06:53  Phos  2.6       Mg     2.2         TPro  4.7<L>  /  Alb  2.1<L>  /  TBili  13.1<H>  /  DBili  x   /  AST  169<H>  /  ALT  143<H>  /  AlkPhos  705<H>        Urinalysis Basic - ( 03 Sep 2017 13:48 )    Color: Orange / Appearance: Turbid / S.021 / pH: x  Gluc: x / Ketone: Trace  / Bili: Large / Urobili: 1   Blood: x / Protein: Trace / Nitrite: Negative   Leuk Esterase: Negative / RBC: 10-25 /HPF / WBC >50 /HPF   Sq Epi: x / Non Sq Epi: x / Bacteria: x    MICROBIOLOGY: pending

## 2017-09-04 NOTE — PROGRESS NOTE ADULT - PROBLEM SELECTOR PLAN 1
- CT a/p shows common bile duct, intrahepatic, as well as pancreatic duct dilation, may be 2/2 distal common bile duct stone vs. pancreatic mass (multiple indeterminate pancreatic masses visualized).  - f/u MRCP   - GI consulted- plan for ERCP on tuesday, will contact GI today for ERCP as patient's mentation has acutely worsened  - continue IVF and pain control as needed - CT a/p shows common bile duct, intrahepatic, as well as pancreatic duct dilation, may be 2/2 distal common bile duct stone vs. pancreatic mass (multiple indeterminate pancreatic masses visualized).  - f/u MRCP   - GI consulted- plan for ERCP on tuesday,   Patient mentation has improved today / neg blood culture so far  - continue IVF and pain control as needed

## 2017-09-04 NOTE — PROGRESS NOTE ADULT - SUBJECTIVE AND OBJECTIVE BOX
Overnight Events: No acute events ON    REVIEW OF SYSTEMS:  CONSTITUTIONAL: No weakness, fevers or chills  EYES/ENT: No visual changes, no throat pain   RESPIRATORY: No cough, wheezing, hemoptysis; No shortness of breath  CARDIOVASCULAR: No chest pain or palpitations  GASTROINTESTINAL: Positive for abdominal pain  GENITOURINARY: No dysuria, frequency or hematuria  NEUROLOGICAL: No dizziness, numbness, or weakness  SKIN: No itching, burning, rashes, or lesions   All other review of systems is negative unless indicated above.    VITAL SIGNS:  Vital Signs Last 24 Hrs  T(C): 36.9 (04 Sep 2017 04:38), Max: 37.1 (03 Sep 2017 16:26)  T(F): 98.4 (04 Sep 2017 04:38), Max: 98.7 (03 Sep 2017 16:26)  HR: 86 (04 Sep 2017 04:38) (82 - 89)  BP: 104/67 (04 Sep 2017 04:38) (99/66 - 104/67)  BP(mean): --  RR: 18 (04 Sep 2017 04:38) (16 - 18)  SpO2: 97% (04 Sep 2017 04:38) (97% - 99%)    PHYSICAL EXAM:   GENERAL: no acute distress  HEENT: NC/AT, EOMI, neck supple, MMM  RESPIRATORY: LCTAB/L, no rhonchi, rales, or wheezing  CARDIOVASCULAR: RRR, no murmurs, gallops, rubs  ABDOMINAL: firm, tender to palpation, positive bowel sounds   EXTREMITIES: no clubbing, cyanosis, or edema  NEUROLOGICAL: alert and oriented x 3, non-focal  SKIN: no rashes or lesions   MUSCULOSKELETAL: no gross joint deformity                          8.5    6.3   )-----------( 222      ( 04 Sep 2017 06:53 )             25.9     09-04    140  |  101  |  19  ----------------------------<  102<H>  4.2   |  32<H>  |  0.91    Ca    9.0      04 Sep 2017 06:53  Phos  2.6     09-04  Mg     2.2     09-04    TPro  4.7<L>  /  Alb  2.1<L>  /  TBili  13.1<H>  /  DBili  x   /  AST  169<H>  /  ALT  143<H>  /  AlkPhos  705<H>  09-04        CAPILLARY BLOOD GLUCOSE        I&O's Summary      MEDICATIONS  (STANDING):  diVALproex  milliGRAM(s) Oral at bedtime  diltiazem    milliGRAM(s) Oral daily  diVALproex  milliGRAM(s) Oral daily  QUEtiapine 25 milliGRAM(s) Oral daily  QUEtiapine 50 milliGRAM(s) Oral at bedtime  nystatin Powder 1 Application(s) Topical two times a day  levothyroxine 50 MICROGram(s) Oral daily  enoxaparin Injectable 40 milliGRAM(s) SubCutaneous daily  metroNIDAZOLE    Tablet 500 milliGRAM(s) Oral every 8 hours  cefepime  IVPB   IV Intermittent   cefepime  IVPB 1000 milliGRAM(s) IV Intermittent every 8 hours  lactated ringers. 1000 milliLiter(s) (100 mL/Hr) IV Continuous <Continuous>  docusate sodium 100 milliGRAM(s) Oral daily

## 2017-09-04 NOTE — PROGRESS NOTE ADULT - PROBLEM SELECTOR PLAN 3
- Bladder wall thickening noted CT, pt denies any urinary symptoms   - UA +, will check urine Cx - Bladder wall thickening noted CT, pt denies any urinary symptoms   - UA +, will check urine Cx  Patient is on Cefepime

## 2017-09-05 NOTE — PROGRESS NOTE ADULT - SUBJECTIVE AND OBJECTIVE BOX
Overnight Events: No acute events ON.     REVIEW OF SYSTEMS:  Unable to fully assess, pt did endorse abdominal pain.     VITAL SIGNS:  Vital Signs Last 24 Hrs  T(C): 37.1 (05 Sep 2017 10:50), Max: 37.1 (05 Sep 2017 00:42)  T(F): 98.7 (05 Sep 2017 10:50), Max: 98.8 (05 Sep 2017 00:42)  HR: 77 (05 Sep 2017 10:50) (61 - 88)  BP: 111/70 (05 Sep 2017 10:50) (99/64 - 120/74)  BP(mean): --  RR: 18 (05 Sep 2017 10:50) (16 - 18)  SpO2: 97% (05 Sep 2017 10:50) (94% - 99%)    PHYSICAL EXAM:   GENERAL: no acute distress, somnolent   HEENT:   RESPIRATORY: LCTAB/L, no rhonchi, rales, or wheezing  CARDIOVASCULAR: RRR, no murmurs, gallops, rubs  ABDOMINAL: soft, non-tender, non-distended, positive bowel sounds   EXTREMITIES: no clubbing, cyanosis, or edema  NEUROLOGICAL: alert and oriented x 3, non-focal  SKIN: no rashes or lesions   MUSCULOSKELETAL: no gross joint deformity                          9.6    8.6   )-----------( 277      ( 05 Sep 2017 07:12 )             28.9     09-05    140  |  99  |  15  ----------------------------<  106<H>  4.0   |  32<H>  |  0.64    Ca    10.0      05 Sep 2017 07:12  Phos  2.1     09-05  Mg     2.2     09-05    TPro  5.1<L>  /  Alb  2.3<L>  /  TBili  16.4<H>  /  DBili  x   /  AST  179<H>  /  ALT  149<H>  /  AlkPhos  830<H>  09-05        CAPILLARY BLOOD GLUCOSE        I&O's Summary      MEDICATIONS  (STANDING):  diVALproex  milliGRAM(s) Oral at bedtime  diltiazem    milliGRAM(s) Oral daily  diVALproex  milliGRAM(s) Oral daily  QUEtiapine 25 milliGRAM(s) Oral daily  QUEtiapine 50 milliGRAM(s) Oral at bedtime  nystatin Powder 1 Application(s) Topical two times a day  levothyroxine 50 MICROGram(s) Oral daily  enoxaparin Injectable 40 milliGRAM(s) SubCutaneous daily  metroNIDAZOLE    Tablet 500 milliGRAM(s) Oral every 8 hours  cefepime  IVPB   IV Intermittent   cefepime  IVPB 1000 milliGRAM(s) IV Intermittent every 8 hours  docusate sodium 100 milliGRAM(s) Oral daily  sodium phosphate IVPB 15 milliMole(s) IV Intermittent once  sodium chloride 0.9%. 1000 milliLiter(s) (75 mL/Hr) IV Continuous <Continuous>

## 2017-09-05 NOTE — PROGRESS NOTE ADULT - SUBJECTIVE AND OBJECTIVE BOX
Pre-Endoscopy Evaluation      Referring Physician:  Dr. Fuller                               Procedure: ERCP    Indication for Procedure: CBD stone    Pertinent History: 83 year old female PMH COPD (on home 2L 02), Bipolar disorder, HTN, GERD, hypothyroidism presents from nursing home with worsening jaundice.  found to have Choledocholithiasis with double duct sign, dilation of CBD and pancreatic duct and a stone in the CBD      PAST MEDICAL & SURGICAL HISTORY:  Bipolar disorder  Hx of rheumatoid arthritis  Cellulitis: b/l lower extremties  Schizophrenia  Parkinson's Disease  HTN (Hypertension)  Hypothyroidism  S/P Endoscopy: previous food impactions with interventions ....not sure when  History of Tonsillectomy      PMH of Gastroparesis [ ]  Gastric Surgery [ ]  Gastric Outlet Obstruction [ ] none    Allergies:    penicillin (Unknown)    Intolerances: none    Latex allergy: [ ] yes [x] no    Medications:MEDICATIONS  (STANDING):  diVALproex  milliGRAM(s) Oral at bedtime  diltiazem    milliGRAM(s) Oral daily  diVALproex  milliGRAM(s) Oral daily  QUEtiapine 25 milliGRAM(s) Oral daily  QUEtiapine 50 milliGRAM(s) Oral at bedtime  nystatin Powder 1 Application(s) Topical two times a day  levothyroxine 50 MICROGram(s) Oral daily  enoxaparin Injectable 40 milliGRAM(s) SubCutaneous daily  metroNIDAZOLE    Tablet 500 milliGRAM(s) Oral every 8 hours  cefepime  IVPB   IV Intermittent   cefepime  IVPB 1000 milliGRAM(s) IV Intermittent every 8 hours  docusate sodium 100 milliGRAM(s) Oral daily  sodium phosphate IVPB 15 milliMole(s) IV Intermittent once  sodium chloride 0.9%. 1000 milliLiter(s) (75 mL/Hr) IV Continuous <Continuous>    MEDICATIONS  (PRN):  HYDROmorphone  Injectable 0.5 milliGRAM(s) IV Push every 4 hours PRN moderate to severe pain (4-10)      Smoking: [ ] yes  [x] no    AICD/PPM: [ ] yes   [ x] no    Pertinent lab data:                        9.6    8.6   )-----------( 277      ( 05 Sep 2017 07:12 )             28.9     09-05    140  |  99  |  15  ----------------------------<  106<H>  4.0   |  32<H>  |  0.64    Ca    10.0      05 Sep 2017 07:12  Phos  2.1     09-05  Mg     2.2     09-05    TPro  5.1<L>  /  Alb  2.3<L>  /  TBili  16.4<H>  /  DBili  x   /  AST  179<H>  /  ALT  149<H>  /  AlkPhos  830<H>  09-05        Physical Examination:      Daily   Vital Signs Last 24 Hrs  T(C): 37.1 (05 Sep 2017 10:50), Max: 37.1 (05 Sep 2017 00:42)  T(F): 98.7 (05 Sep 2017 10:50), Max: 98.8 (05 Sep 2017 00:42)  HR: 77 (05 Sep 2017 10:50) (61 - 88)  BP: 111/70 (05 Sep 2017 10:50) (99/64 - 120/74)  BP(mean): --  RR: 18 (05 Sep 2017 10:50) (16 - 18)  SpO2: 97% (05 Sep 2017 10:50) (94% - 99%)      Constitutional: NAD    HEENT: PERRLA, EOMI,       Neck:  No JVD    Respiratory: CTAB/L    Cardiovascular: S1 and S2    Gastrointestinal: BS+, soft, NT/ND    Extremities: No peripheral edema    Neurological: A/O x 3, no focal deficits      Comments:    ASA Class: I [ ]  II [ ]  III [ ]  IV [x ]    The patient is a suitable candidate for the planned procedure unless box checked [ ]  No, explain: Pre-Endoscopy Evaluation      Referring Physician:  Dr. Fuller                               Procedure: ERCP    Indication for Procedure: CBD stone    Pertinent History: 83 year old female PMH COPD (on home 2L 02), Bipolar disorder, HTN, GERD, hypothyroidism presents from nursing home with worsening jaundice.  found to have Choledocholithiasis with double duct sign, dilation of CBD and pancreatic duct and a stone in the CBD      PAST MEDICAL & SURGICAL HISTORY:  Bipolar disorder  Hx of rheumatoid arthritis  Cellulitis: b/l lower extremties  Schizophrenia  Parkinson's Disease  HTN (Hypertension)  Hypothyroidism  S/P Endoscopy: previous food impactions with interventions ....not sure when  History of Tonsillectomy      PMH of Gastroparesis [ ]  Gastric Surgery [ ]  Gastric Outlet Obstruction [ ] none    Allergies:    penicillin (Unknown)    Intolerances: none    Latex allergy: [ ] yes [x] no    Medications:MEDICATIONS  (STANDING):  diVALproex  milliGRAM(s) Oral at bedtime  diltiazem    milliGRAM(s) Oral daily  diVALproex  milliGRAM(s) Oral daily  QUEtiapine 25 milliGRAM(s) Oral daily  QUEtiapine 50 milliGRAM(s) Oral at bedtime  nystatin Powder 1 Application(s) Topical two times a day  levothyroxine 50 MICROGram(s) Oral daily  enoxaparin Injectable 40 milliGRAM(s) SubCutaneous daily  metroNIDAZOLE    Tablet 500 milliGRAM(s) Oral every 8 hours  cefepime  IVPB   IV Intermittent   cefepime  IVPB 1000 milliGRAM(s) IV Intermittent every 8 hours  docusate sodium 100 milliGRAM(s) Oral daily  sodium phosphate IVPB 15 milliMole(s) IV Intermittent once  sodium chloride 0.9%. 1000 milliLiter(s) (75 mL/Hr) IV Continuous <Continuous>    MEDICATIONS  (PRN):  HYDROmorphone  Injectable 0.5 milliGRAM(s) IV Push every 4 hours PRN moderate to severe pain (4-10)      Smoking: [ ] yes  [x] no    AICD/PPM: [ ] yes   [ x] no    Pertinent lab data:                        9.6    8.6   )-----------( 277      ( 05 Sep 2017 07:12 )             28.9     09-05    140  |  99  |  15  ----------------------------<  106<H>  4.0   |  32<H>  |  0.64    Ca    10.0      05 Sep 2017 07:12  Phos  2.1     09-05  Mg     2.2     09-05    TPro  5.1<L>  /  Alb  2.3<L>  /  TBili  16.4<H>  /  DBili  x   /  AST  179<H>  /  ALT  149<H>  /  AlkPhos  830<H>  09-05        Physical Examination:      Daily   Vital Signs Last 24 Hrs  T(C): 37.1 (05 Sep 2017 10:50), Max: 37.1 (05 Sep 2017 00:42)  T(F): 98.7 (05 Sep 2017 10:50), Max: 98.8 (05 Sep 2017 00:42)  HR: 77 (05 Sep 2017 10:50) (61 - 88)  BP: 111/70 (05 Sep 2017 10:50) (99/64 - 120/74)  BP(mean): --  RR: 18 (05 Sep 2017 10:50) (16 - 18)  SpO2: 97% (05 Sep 2017 10:50) (94% - 99%)      Constitutional: NAD    HEENT: PERRLA, EOMI,       Neck:  No JVD    Respiratory: CTAB/L    Cardiovascular: S1 and S2    Gastrointestinal: BS+, soft, NT/ND    Extremities: No peripheral edema    Neurological: Lethargic    Comments:    ASA Class: I [ ]  II [ ]  III [ ]  IV [x ]    The patient is a suitable candidate for the planned procedure unless box checked [ ]  No, explain:

## 2017-09-05 NOTE — PROGRESS NOTE ADULT - PROBLEM SELECTOR PLAN 3
- Bladder wall thickening noted CT, pt denies any urinary symptoms   - UA +, will check urine Cx  Patient is on Cefepime

## 2017-09-05 NOTE — PROGRESS NOTE ADULT - SUBJECTIVE AND OBJECTIVE BOX
Advanced Endoscopy Chart Note    Pt brought down the endoscopy unit for EUS/ERCP. After discussion with anesthesia, pt deemed high risk for general anesthesia given current clinical status and comorbidities. Would obtain IR evaluation for perc dianelys tube. Intrahepatics are dilated. Plan discussed with medicine team and pt's son - Jason Hernandez ([phone])    Gary Zelaya  GI Fellow

## 2017-09-05 NOTE — PROGRESS NOTE ADULT - SUBJECTIVE AND OBJECTIVE BOX
INFECTIOUS DISEASES FOLLOW UP--Harry Vu MD  Pager [phone]    This is a follow up note for this  83y Female with  biliary obstruction---unclear if there is cholangitis.  Remains with lethargy, barely arousable.      Further ROS:  not obtainable    ANTIBIOTICS/RELEVANT:  antimicrobials  metroNIDAZOLE    Tablet 500 milliGRAM(s) Oral every 8 hours  cefepime  IVPB   IV Intermittent   cefepime  IVPB 1000 milliGRAM(s) IV Intermittent every 8 hours      OTHER:  HYDROmorphone  Injectable 0.5 milliGRAM(s) IV Push every 4 hours PRN  diVALproex  milliGRAM(s) Oral at bedtime  diltiazem    milliGRAM(s) Oral daily  diVALproex  milliGRAM(s) Oral daily  QUEtiapine 25 milliGRAM(s) Oral daily  QUEtiapine 50 milliGRAM(s) Oral at bedtime  nystatin Powder 1 Application(s) Topical two times a day  levothyroxine 50 MICROGram(s) Oral daily  enoxaparin Injectable 40 milliGRAM(s) SubCutaneous daily  docusate sodium 100 milliGRAM(s) Oral daily  sodium phosphate IVPB 15 milliMole(s) IV Intermittent once  sodium chloride 0.9%. 1000 milliLiter(s) IV Continuous <Continuous>      Objective:  Vital Signs Last 24 Hrs  T(C): 37.1 (05 Sep 2017 10:50), Max: 37.1 (05 Sep 2017 00:42)  T(F): 98.7 (05 Sep 2017 10:50), Max: 98.8 (05 Sep 2017 00:42)  HR: 77 (05 Sep 2017 10:50) (61 - 88)  BP: 111/70 (05 Sep 2017 10:50) (99/64 - 120/74)  BP(mean): --  RR: 18 (05 Sep 2017 10:50) (16 - 18)  SpO2: 97% (05 Sep 2017 10:50) (94% - 99%)    PHYSICAL EXAM:  Constitutional:no acute distress...icteric  Eyes:WM, EOMI  Ear/Nose/Throat: no oral lesions, 	  Respiratory: clear BL  Cardiovascular: S1S2  Gastrointestinal:soft, (+) BS, no tenderness  Extremities:no e/e/c  No Lymphadenopathy  IV sites not inflammed.    LABS:                        9.6    8.6   )-----------( 277      ( 05 Sep 2017 07:12 )             28.9     -05    140  |  99  |  15  ----------------------------<  106<H>  4.0   |  32<H>  |  0.64    Ca    10.0      05 Sep 2017 07:12  Phos  2.1       Mg     2.2         TPro  5.1<L>  /  Alb  2.3<L>  /  TBili  16.4<H>  /  DBili  x   /  AST  179<H>  /  ALT  149<H>  /  AlkPhos  830<H>        Urinalysis Basic - ( 03 Sep 2017 13:48 )    Color: Orange / Appearance: Turbid / S.021 / pH: x  Gluc: x / Ketone: Trace  / Bili: Large / Urobili: 1   Blood: x / Protein: Trace / Nitrite: Negative   Leuk Esterase: Negative / RBC: 10-25 /HPF / WBC >50 /HPF   Sq Epi: x / Non Sq Epi: x / Bacteria: x

## 2017-09-05 NOTE — PROGRESS NOTE ADULT - ATTENDING COMMENTS
D/W IR attending - approach for percutaneous cholecystostomy drain technically difficult. Patient with likely malignancy and overall poor prognosis. Will obtain palliative care evaluation. Likely needs hospice.

## 2017-09-05 NOTE — PROGRESS NOTE ADULT - PROBLEM SELECTOR PLAN 1
- CT a/p shows common bile duct, intrahepatic, as well as pancreatic duct dilation, may be 2/2 distal common bile duct stone vs. pancreatic mass (multiple indeterminate pancreatic masses visualized).  - MRCP shows ill defined mass in pancreas that can be consistent with malignancy  - GI consulted- ERCP may not be possible as pt may not be able to tolerate general anesthesia. Pt may undergo perc dianelys with IR instead   Patient mentation has improved today / neg blood culture so far  - continue IVF and pain control as needed

## 2017-09-06 NOTE — CONSULT NOTE ADULT - PROBLEM SELECTOR RECOMMENDATION 2
Indeterminate bilateral ovarian mass   No plan for further evaluation at this time. Two week history of epigastric pain and jaundice found to have obstructive cholestasis, with CT abdomen suggesting pancreatic mass, and MRCP confirming such.

## 2017-09-06 NOTE — CONSULT NOTE ADULT - ATTENDING COMMENTS
This frail elderly woman, who is a resident of a NH, is here with progressive jaundice.  She has cholestasis and perhaps an obstruction within the biliary system.  While cholangitis is on the ddx, she has no fevers.  She does however, appear to have RUQ pain by my examination.    The PCN allergy is vague---I think it would be safe to Rx with cefepime and flagyl for now.
I have personally seen and examined the patient and completed the note.
I have personally seen and examined this patient and agree with the above assessment and plan.   Awaiting call back from son.

## 2017-09-06 NOTE — CONSULT NOTE ADULT - PROBLEM SELECTOR RECOMMENDATION 3
Patient believes that she has gallstones and is frustrated that the primary team is concerned about the possibility of pancreatic mass and unable to definitively diagnose the reason for this suspected mass. Indeterminate bilateral ovarian mass   No plan for further evaluation at this time.

## 2017-09-06 NOTE — PROGRESS NOTE ADULT - PROBLEM SELECTOR PLAN 1
- CT a/p shows common bile duct, intrahepatic, as well as pancreatic duct dilation, may be 2/2 distal common bile duct stone vs. pancreatic mass (multiple indeterminate pancreatic masses visualized).  - MRCP shows ill defined mass in pancreas that can be consistent with malignancy  - GI consulted- ERCP was not possible as anesthesia deemed patient to be high risk.   -IR also declined perc dianelys placement  -Given lack of possible interventions, palliative has been consulted and is on board. Pt's HCP is agreeable to palliative care  - neg blood culture so far  - continue IVF and pain control as needed  -Per ID, although pt does not seen to currently present with classic cholangitis, she is at high risk; therefore, continue abx therapy with cefapime and flagyl

## 2017-09-06 NOTE — CONSULT NOTE ADULT - SUBJECTIVE AND OBJECTIVE BOX
HPI:  83F PMH COPD (on home 2L 02), bipolar disorder, htn, gerd, hypothyroidism presents from nursing home w/ worsening jaundice over past two weeks.  Also with light light yellow BMs.  Has had worsening epigastric pain as well as worsening rib and back pain .  Denies fevers, chills, chest pain, nausea, vomiting, cough.  She had labwork done at nursing home 9/1/17 which showed bilirubin 12.2 (9.9 direct), w/ , , alk phos 546 so was sent to [facility] for evaluation.  Reports decreased PO intake the past two days 2/2 no appetite. Denies weight loss.      In the ED VS 98.6 92 100/66 16 99%RA  Labs notable for WBC 11.7 Hgb 10.8, .  BUN 13 Cr 0.91 glucose 115. Alb 3.1 Bili 13.5, 12.5 direct. Alk phos 588      CT A/P w/ common bile duct and moderate intrahepatic biliary dilatation, Pancreatic ductal dilatation, may be in part due to distal common bile duct   stone although underlying duodenal/ampullary neoplasm cannot be excluded given soft tissue structure in the second portion of the duodenum. This   can be further characterized on a follow-up MRI/MRCP. Pancreatic ductal dilatation and multiple indeterminate pancreatic lesions, which may be in part due to IPMN and can also be characterized  on a follow-up MRI/MRCP.   Nonspecific mild peripancreatic stranding. Recommend correlation with serum lipase to assess acute pancreatitis.  Indeterminate bilateral adnexal lesions. Borderline endometrium. Endometrial/ovarian neoplasm cannot be excluded. Recommend follow-up.  Bladder wall thickening and mucosal enhancement. Recommend correlation with urinalysis to assess cystitis.  Elevated right hemidiaphragm with right lower lobe atelectasis. Nonspecific patchy opacity in the left lower lobe. Recommend clinical correlation to assess pneumonia and follow-up to assess resolution.    In the ED, given morphine and cefepime x 1. (02 Sep 2017 04:24)      PERTINENT PM/SXH:   Bipolar disorder  Hx of rheumatoid arthritis  Cellulitis  Schizophrenia  Parkinson's Disease  HTN (Hypertension)  Hypothyroidism    S/P Endoscopy  History of Tonsillectomy  HTN (Hypertension)    SOCIAL HISTORY:   Significant other/partner:  [ ]YES  [ ]NO*  Children:  [ ]YES  [ ]NO*  Episcopal/Spirituality:  Substance hx:  [ ]YES  [ ]NO*  Tobacco hx:  [ ]YES  [ ]NO*  Alcohol hx: [ ]YES  [ ]NO*    Home Opioid hx:  [ ]YES  [ ]NO   Living Situation: [ ]Home  [ ]Long term care  [ ]Rehab [ ]Other    FAMILY HISTORY:  No pertinent family history in first degree relatives    [ ]Family history non-contributory     BASELINE (I)ADLs (prior to admission):  [place]: [ ]total  [ ] moderate [ ]dependent    ADVANCE DIRECTIVES:    DNR Yes    MOLST  [ ]YES [ ]NO                      [ ]Completed  Health Care Proxy [ ]YES  [ ]NO                   [ ]Completed  Living Will  [ ]YES [ ]NO           [ ]Surrogate  [ ]HCP  [ ]Guardian:  Phone#:    Allergies    penicillin (Unknown)    Intolerances      MEDICATIONS  (STANDING):  diVALproex  milliGRAM(s) Oral at bedtime  diltiazem    milliGRAM(s) Oral daily  diVALproex  milliGRAM(s) Oral daily  QUEtiapine 25 milliGRAM(s) Oral daily  QUEtiapine 50 milliGRAM(s) Oral at bedtime  nystatin Powder 1 Application(s) Topical two times a day  levothyroxine 50 MICROGram(s) Oral daily  enoxaparin Injectable 40 milliGRAM(s) SubCutaneous daily  metroNIDAZOLE    Tablet 500 milliGRAM(s) Oral every 8 hours  cefepime  IVPB   IV Intermittent   cefepime  IVPB 1000 milliGRAM(s) IV Intermittent every 8 hours  sodium chloride 0.9%. 1000 milliLiter(s) (75 mL/Hr) IV Continuous <Continuous>    MEDICATIONS  (PRN):  HYDROmorphone  Injectable 0.5 milliGRAM(s) IV Push every 4 hours PRN moderate to severe pain (4-10)      PRESENT SYMPTOMS:  Source: [ ]Patient   [ ]Family   [ ]Team     Pain:                        [ ]No [ ]Yes             [ ]Mild [ ]Moderate [ ]Severe  Onset -  Location -  Duration -  Character -  Alleviating/Aggravating -  Radiation -  Timing -    Dyspnea:                [ ]No [ ]Yes             [ ]Mild [ ]Moderate [ ]Severe  Anxiety:                  [ ]No [ ]Yes             [ ]Mild [ ]Moderate [ ]Severe  Fatigue:                  [ ]No [ ]Yes             [ ]Mild [ ]Moderate [ ]Severe  Nausea:                  [ ]No [ ]Yes             [ ]Mild [ ]Moderate [ ]Severe  Loss of appetite:   [ ]No [ ]Yes             [ ]Mild [ ]Moderate [ ]Severe  Constipation:        [ ]No [ ]Yes             [ ]Mild [ ]Moderate [ ]Severe    Other Symptoms:  [ ]All other review of systems negative   [ ]Unable to obtain due to poor mentation     Karnofsky Performance Score/Palliative Performance Status Version 2:         %  PHYSICAL EXAM:  Vital Signs Last 24 Hrs  T(C): 36.8 (06 Sep 2017 13:44), Max: 36.8 (06 Sep 2017 13:44)  T(F): 98.3 (06 Sep 2017 13:44), Max: 98.3 (06 Sep 2017 13:44)  HR: 74 (06 Sep 2017 13:44) (74 - 79)  BP: 103/67 (06 Sep 2017 13:44) (95/61 - 112/71)  BP(mean): --  RR: 18 (06 Sep 2017 13:44) (18 - 18)  SpO2: 97% (06 Sep 2017 13:44) (97% - 99%) I&O's Summary    05 Sep 2017 07:01  -  06 Sep 2017 07:00  --------------------------------------------------------  IN: 1100 mL / OUT: 0 mL / NET: 1100 mL      General:  [ ]Alert  [ ]Oriented x   [ ]Lethargic  [ ]Agitated   [ ]Cachexia   [ ]Unarousable  [ ]Verbal  [ ]Non-Verbal    HEENT:  [ ]Normal   [ ]Dry mouth   [ ]ET Tube/Trach  [ ]Oral lesions    Lungs:   [ ]Clear [ ]Tachypnea  [ ]Audible excessive secretions   [ ]Rhonchi        [ ]Right [ ]Left [ ]Bilateral  [ ]Crackles        [ ]Right [ ]Left [ ]Bilateral  [ ]Wheezing     [ ]Right [ ]Left [ ]Bilateral    Cardiovascular:  [ ]Regular [ ]Irregular [ ]Tachycardia  [ ]Bradycardia  [ ]Murmur [ ]Other  Abdomen: [ ]Soft  [ ]Distended   [ ]+BS  [ ]Non tender [ ]Tender  [ ]PEG/]OGT/ NGT   Last BM:     Genitourinary: [ ]Normal [  Incontinent   [ ]Oliguria/Anuria   [ ]Jimenes  Musculoskeletal:  [ ]Normal   [ ]Weakness  [ ]Bedbound/Wheelchair bound [ ]Edema  Neurological: [ ]No focal deficits  [ ] Cognitive impairment  [ ] Dysphagia [ ]Dysarthria [ ] Paresis [ ]Other     Skin: [ ]Normal   [ ]Pressure ulcer(s)  [ ]Rash    LABS:                        9.6    8.6   )-----------( 277      ( 05 Sep 2017 07:12 )             28.9     09-05    140  |  99  |  15  ----------------------------<  106<H>  4.0   |  32<H>  |  0.64    Ca    10.0      05 Sep 2017 07:12  Phos  2.1     09-05  Mg     2.2     09-05    TPro  5.1<L>  /  Alb  2.3<L>  /  TBili  16.4<H>  /  DBili  x   /  AST  179<H>  /  ALT  149<H>  /  AlkPhos  830<H>  09-05          Shock: [ ]Septic [ ]Cardiogenic [ ]Neurologic [ ]Hypovolemic  Vasopressors x   Inotrops x     Protein Calorie Malnutrition: [ ]Mild [ ]Moderatw [ ]Severe    Oral Intake: [ ]Unable/mouth care only [ ]Minimal [ ]Moderate [ ]Full Capability  Diet: [ ]NPO [ ]Tube feeds [ ]TPN [ ]Other     RADIOLOGY & ADDITIONAL STUDIES:    REFERRALS:   [ ]Chaplaincy  [  Hospice  [ ]Child Life  [ ]Social Work  [ ]Case management [ ]Holistic Therapy HPI:  83F PMH COPD (on home 2L 02), bipolar disorder, htn, gerd, hypothyroidism presents from nursing home w/ worsening jaundice over past two weeks.  Also with light light yellow BMs.  Has had worsening epigastric pain as well as worsening rib and back pain .  Denies fevers, chills, chest pain, nausea, vomiting, cough.  She had labwork done at nursing home 9/1/17 which showed bilirubin 12.2 (9.9 direct), w/ , , alk phos 546 so was sent to [facility] for evaluation.  Reports decreased PO intake the past two days 2/2 no appetite. Denies weight loss.      In the ED VS 98.6 92 100/66 16 99%RA  Labs notable for WBC 11.7 Hgb 10.8, .  BUN 13 Cr 0.91 glucose 115. Alb 3.1 Bili 13.5, 12.5 direct. Alk phos 588      CT A/P w/ common bile duct and moderate intrahepatic biliary dilatation, Pancreatic ductal dilatation, may be in part due to distal common bile duct   stone although underlying duodenal/ampullary neoplasm cannot be excluded given soft tissue structure in the second portion of the duodenum. This   can be further characterized on a follow-up MRI/MRCP. Pancreatic ductal dilatation and multiple indeterminate pancreatic lesions, which may be in part due to IPMN and can also be characterized  on a follow-up MRI/MRCP.   Nonspecific mild peripancreatic stranding. Recommend correlation with serum lipase to assess acute pancreatitis.  Indeterminate bilateral adnexal lesions. Borderline endometrium. Endometrial/ovarian neoplasm cannot be excluded. Recommend follow-up.  Bladder wall thickening and mucosal enhancement. Recommend correlation with urinalysis to assess cystitis.  Elevated right hemidiaphragm with right lower lobe atelectasis. Nonspecific patchy opacity in the left lower lobe. Recommend clinical correlation to assess pneumonia and follow-up to assess resolution.    In the ED, given morphine and cefepime x 1. (02 Sep 2017 04:24)      PERTINENT PM/SXH:   Bipolar disorder  Hx of rheumatoid arthritis  Cellulitis  Schizophrenia  Parkinson's Disease  HTN (Hypertension)  Hypothyroidism    S/P Endoscopy  History of Tonsillectomy  HTN (Hypertension)    SOCIAL HISTORY:   Significant other/partner:  [ ]YES  [ ]NO*  Children:  [ ]YES  [ ]NO*  Oriental orthodox/Spirituality:  Substance hx:  [ ]YES  [ ]NO*  Tobacco hx:  [ ]YES  [ ]NO*  Alcohol hx: [ ]YES  [ ]NO*    Home Opioid hx:  [ ]YES  [ ]NO   Living Situation: [ ]Home  [ ]Long term care  [ ]Rehab [ ]Other    FAMILY HISTORY:  No pertinent family history in first degree relatives    [ ]Family history non-contributory     BASELINE (I)ADLs (prior to admission):  [place]: [ ]total  [ ] moderate [ ]dependent    ADVANCE DIRECTIVES:    DNR Yes    MOLST  [ ]YES [ ]NO                      [ ]Completed  Health Care Proxy [ ]YES  [ ]NO                   [ ]Completed  Living Will  [ ]YES [ ]NO           [ ]Surrogate  [ ]HCP  [ ]Guardian:  Phone#:    Allergies    penicillin (Unknown)    Intolerances      MEDICATIONS  (STANDING):  diVALproex  milliGRAM(s) Oral at bedtime  diltiazem    milliGRAM(s) Oral daily  diVALproex  milliGRAM(s) Oral daily  QUEtiapine 25 milliGRAM(s) Oral daily  QUEtiapine 50 milliGRAM(s) Oral at bedtime  nystatin Powder 1 Application(s) Topical two times a day  levothyroxine 50 MICROGram(s) Oral daily  enoxaparin Injectable 40 milliGRAM(s) SubCutaneous daily  metroNIDAZOLE    Tablet 500 milliGRAM(s) Oral every 8 hours  cefepime  IVPB   IV Intermittent   cefepime  IVPB 1000 milliGRAM(s) IV Intermittent every 8 hours  sodium chloride 0.9%. 1000 milliLiter(s) (75 mL/Hr) IV Continuous <Continuous>    MEDICATIONS  (PRN):  HYDROmorphone  Injectable 0.5 milliGRAM(s) IV Push every 4 hours PRN moderate to severe pain (4-10)      PRESENT SYMPTOMS:  Source: [x ]Patient   [ ]Family   [ ]Team     Pain:                        [ ]No [x ]Yes             [ ]Mild [x ]Moderate [ ]Severe  Onset -  Location - Epigastric/ruq pain, booring radiating to back.  Patient poor historian.  Duration -  Character -  Alleviating/Aggravating -  Radiation -  Timing -    Dyspnea:                [x ]No [ ]Yes             [ ]Mild [ ]Moderate [ ]Severe  Anxiety:                  [x ]No [ ]Yes             [ ]Mild [ ]Moderate [ ]Severe  Fatigue:                  [ ]No [x ]Yes             [ ]Mild [x ]Moderate [ ]Severe  Nausea:                  [x ]No [ ]Yes             [ ]Mild [ ]Moderate [ ]Severe  Loss of appetite:   [ ]No [xx ]Yes             [ ]Mild [ x]Moderate [ ]Severe  Constipation:        [ ]No [ ]Yes             [ ]Mild [ ]Moderate [ ]Severe    Other Symptoms:  [ ]All other review of systems negative   [ x]Unable to obtain due to poor mentation     Karnofsky Performance Score/Palliative Performance Status Version 2:     30    %  PHYSICAL EXAM:  Vital Signs Last 24 Hrs  T(C): 36.8 (06 Sep 2017 13:44), Max: 36.8 (06 Sep 2017 13:44)  T(F): 98.3 (06 Sep 2017 13:44), Max: 98.3 (06 Sep 2017 13:44)  HR: 74 (06 Sep 2017 13:44) (74 - 79)  BP: 103/67 (06 Sep 2017 13:44) (95/61 - 112/71)  BP(mean): --  RR: 18 (06 Sep 2017 13:44) (18 - 18)  SpO2: 97% (06 Sep 2017 13:44) (97% - 99%) I&O's Summary    05 Sep 2017 07:01  -  06 Sep 2017 07:00  --------------------------------------------------------  IN: 1100 mL / OUT: 0 mL / NET: 1100 mL      General:  Jaundiced  [ x]Alert  [x ]Oriented x 2  [ ]Lethargic  [ ]Agitated   [ ]Cachexia   [ ]Unarousable  [x ]Verbal  [ ]Non-Verbal    HEENT:  [ ]Normal   [x ]Dry mouth   [ ]ET Tube/Trach  [ ]Oral lesions    Lungs:   [ x]Clear [ ]Tachypnea  [ ]Audible excessive secretions   [ ]Rhonchi        [ ]Right [ ]Left [ ]Bilateral  [ ]Crackles        [ ]Right [ ]Left [ ]Bilateral  [ ]Wheezing     [ ]Right [ ]Left [ ]Bilateral    Cardiovascular:  [x ]Regular [ ]Irregular [ ]Tachycardia  [ ]Bradycardia  [ ]Murmur [ ]Other +S1 +S2   Abdomen: [x ]Soft  [ ]Distended   [x ]+BS  [ ]Non tender [x ]Tender  [ ]PEG/]OGT/ NGT   Last BM:  see eMR which i have reviewed  Genitourinary: [ ]Normal [x  Incontinent   [ ]Oliguria/Anuria   [ ]Jimenes  Musculoskeletal:  [ ]Normal   [ x]Weakness  [ ]Bedbound/Wheelchair bound [ ]Edema  Neurological: [ ]No focal deficits  [x ] Cognitive impairment  [ ] Dysphagia [ ]Dysarthria [ ] Paresis [ ]Other     Skin: [ ]Normal   [ ]Pressure ulcer(s)  [ ]Rash    LABS:                        9.6    8.6   )-----------( 277      ( 05 Sep 2017 07:12 )             28.9     09-05    140  |  99  |  15  ----------------------------<  106<H>  4.0   |  32<H>  |  0.64    Ca    10.0      05 Sep 2017 07:12  Phos  2.1     09-05  Mg     2.2     09-05    TPro  5.1<L>  /  Alb  2.3<L>  /  TBili  16.4<H>  /  DBili  x   /  AST  179<H>  /  ALT  149<H>  /  AlkPhos  830<H>  09-05          Shock: [ ]Septic [ ]Cardiogenic [ ]Neurologic [ ]Hypovolemic  Vasopressors x   Inotrops x     Protein Calorie Malnutrition: [ ]Mild [ ]Moderate [ ]Severe    Oral Intake: [ ]Unable/mouth care only [x ]Minimal [ ]Moderate [ ]Full Capability  Diet: [ ]NPO [ ]Tube feeds [ ]TPN [ ]Other     RADIOLOGY & ADDITIONAL STUDIES:    REFERRALS:   [ ]Chaplaincy  [  Hospice  [ ]Child Life  [ ]Social Work  [ ]Case management [ ]Holistic Therapy

## 2017-09-06 NOTE — CONSULT NOTE ADULT - PROBLEM SELECTOR RECOMMENDATION 9
Two week history of epigastric pain and jaundice found to have obstructive cholestasis, with CT abdomen suggesting pancreatic mass, and MRCP confirming such. She is not an appropriate candidate for anesthesia for ERCP, and per IR, approach for percutaneous cholecystostomy is technically difficult.    She has pain in the RUQ/epigastrium that is well controlled with her hydromorphone 0.4mg IV q4h prn. In the past 24 hours, she has used 1.6mg total. secondary to pancreatic mass.  continue IV dilaudid 0.5mg PRN as ordered.   She has pain in the RUQ/epigastrium that is well controlled with her hydromorphone 0.5 mg IV q4h prn. In the past 24 hours, she has used 2.0mg total.

## 2017-09-06 NOTE — PROGRESS NOTE ADULT - ATTENDING COMMENTS
Impression:    #1.  Pancreatic head mass    #2.  Obstructive jaundice    #3.  Parkinson's disease, dementia and debility    Recommendation:    #1.  Pt deemed poor candidate for general anesthesia for ERCP yesterday.    #2.  Per IR attending Dr. Lancaster, insufficient intrahepatic biliary dilation to allow for percutaneous biliary drainage.    #3.  Family discussion with palliative care consultant/medicine team.

## 2017-09-06 NOTE — CONSULT NOTE ADULT - PROBLEM SELECTOR RECOMMENDATION 9
Poor performance status precludes invasive work up or treatment which in all likelihood would not offer survival benefit/or positive impact on QOL

## 2017-09-06 NOTE — CONSULT NOTE ADULT - ASSESSMENT
83F w/ hx of COPD (on home 2L 02), bipolar disorder, htn, gerd, hypothyroidism presents from nursing home w/ epigastric pain and jaundice w/ labs suggestive of obstructive juandice which may be 2/2 biliary/pancreatic malignancy based on MRCP, high risk for anesthesia to do ERCP and not a candidate for IR guided cholecystostomy. Also found to have bilateral adnexal masses on CT abdomen and pelvis. Palliative Care consulted to assist with goals of care.

## 2017-09-06 NOTE — PROGRESS NOTE ADULT - SUBJECTIVE AND OBJECTIVE BOX
Overnight Events: No acute events ON.     REVIEW OF SYSTEMS:  Unable to assess.    VITAL SIGNS:  Vital Signs Last 24 Hrs  T(C): 36.4 (06 Sep 2017 04:20), Max: 36.7 (05 Sep 2017 15:05)  T(F): 97.5 (06 Sep 2017 04:20), Max: 98.1 (05 Sep 2017 21:55)  HR: 76 (06 Sep 2017 04:20) (74 - 79)  BP: 95/61 (06 Sep 2017 04:20) (95/61 - 112/71)  BP(mean): --  RR: 18 (06 Sep 2017 04:20) (18 - 18)  SpO2: 99% (06 Sep 2017 04:20) (97% - 99%)    PHYSICAL EXAM:   GENERAL: very somnolent  HEENT: NC/AT, EOMI, neck supple, MMM  RESPIRATORY: LCTAB/L, no rhonchi, rales, or wheezing  CARDIOVASCULAR: RRR, no murmurs, gallops, rubs  ABDOMINAL: firm, tender to palpation especially in RUQ, distended, positive bowel sounds   EXTREMITIES: no clubbing, cyanosis, or edema  NEUROLOGICAL: alert and oriented x 1, non-focal  SKIN: no rashes or lesions, icteric   MUSCULOSKELETAL: no gross joint deformity                          9.6    8.6   )-----------( 277      ( 05 Sep 2017 07:12 )             28.9     09-05    140  |  99  |  15  ----------------------------<  106<H>  4.0   |  32<H>  |  0.64    Ca    10.0      05 Sep 2017 07:12  Phos  2.1     09-05  Mg     2.2     09-05    TPro  5.1<L>  /  Alb  2.3<L>  /  TBili  16.4<H>  /  DBili  x   /  AST  179<H>  /  ALT  149<H>  /  AlkPhos  830<H>  09-05        CAPILLARY BLOOD GLUCOSE  251 (06 Sep 2017 12:40)        I&O's Summary    05 Sep 2017 07:01  -  06 Sep 2017 07:00  --------------------------------------------------------  IN: 1100 mL / OUT: 0 mL / NET: 1100 mL        MEDICATIONS  (STANDING):  diVALproex  milliGRAM(s) Oral at bedtime  diltiazem    milliGRAM(s) Oral daily  diVALproex  milliGRAM(s) Oral daily  QUEtiapine 25 milliGRAM(s) Oral daily  QUEtiapine 50 milliGRAM(s) Oral at bedtime  nystatin Powder 1 Application(s) Topical two times a day  levothyroxine 50 MICROGram(s) Oral daily  enoxaparin Injectable 40 milliGRAM(s) SubCutaneous daily  metroNIDAZOLE    Tablet 500 milliGRAM(s) Oral every 8 hours  cefepime  IVPB   IV Intermittent   cefepime  IVPB 1000 milliGRAM(s) IV Intermittent every 8 hours  docusate sodium 100 milliGRAM(s) Oral daily  sodium chloride 0.9%. 1000 milliLiter(s) (75 mL/Hr) IV Continuous <Continuous>

## 2017-09-06 NOTE — PROGRESS NOTE ADULT - SUBJECTIVE AND OBJECTIVE BOX
Chief Complaint:  Patient is a 83y old  Female who presents with a chief complaint of epigastric pain, transaminitis, elevated bilirubin on outpatient labs (03 Sep 2017 13:19)      Interval Events: Afebrile. Continues to have epigastric/RUQ pain.     Allergies:  penicillin (Unknown)      Home Medications:    Hospital Medications:  HYDROmorphone  Injectable 0.5 milliGRAM(s) IV Push every 4 hours PRN  diVALproex  milliGRAM(s) Oral at bedtime  diltiazem    milliGRAM(s) Oral daily  diVALproex  milliGRAM(s) Oral daily  QUEtiapine 25 milliGRAM(s) Oral daily  QUEtiapine 50 milliGRAM(s) Oral at bedtime  nystatin Powder 1 Application(s) Topical two times a day  levothyroxine 50 MICROGram(s) Oral daily  enoxaparin Injectable 40 milliGRAM(s) SubCutaneous daily  metroNIDAZOLE    Tablet 500 milliGRAM(s) Oral every 8 hours  cefepime  IVPB   IV Intermittent   cefepime  IVPB 1000 milliGRAM(s) IV Intermittent every 8 hours  sodium chloride 0.9%. 1000 milliLiter(s) IV Continuous <Continuous>      PMHX/PSHX:  Bipolar disorder  Hx of rheumatoid arthritis  Cellulitis  Schizophrenia  Parkinson's Disease  HTN (Hypertension)  Hypothyroidism  S/P Endoscopy  History of Tonsillectomy  HTN (Hypertension)      Family history:  No pertinent family history in first degree relatives      ROS: as per HPI     PHYSICAL EXAM:   Vital Signs:  Vital Signs Last 24 Hrs  T(C): 36.8 (06 Sep 2017 13:44), Max: 36.8 (06 Sep 2017 13:44)  T(F): 98.3 (06 Sep 2017 13:44), Max: 98.3 (06 Sep 2017 13:44)  HR: 74 (06 Sep 2017 13:44) (74 - 79)  BP: 103/67 (06 Sep 2017 13:44) (95/61 - 112/71)  BP(mean): --  RR: 18 (06 Sep 2017 13:44) (18 - 18)  SpO2: 97% (06 Sep 2017 13:44) (97% - 99%)  Daily     Daily Weight in k (06 Sep 2017 10:10)    GENERAL:  ill- appearing   HEENT:  sclera -icteric  CHEST:  breath sounds clear  HEART:  Regular rhythm  ABDOMEN:  Soft, ttp epigastrium/RUQ, non-distended, normoactive bowel sounds  SKIN:  jaundiced   NEURO:  oriented x 1     LABS:                        9.6    8.6   )-----------( 277      ( 05 Sep 2017 07:12 )             28.9         140  |  99  |  15  ----------------------------<  106<H>  4.0   |  32<H>  |  0.64    Ca    10.0      05 Sep 2017 07:12  Phos  2.1       Mg     2.2         TPro  5.1<L>  /  Alb  2.3<L>  /  TBili  16.4<H>  /  DBili  x   /  AST  179<H>  /  ALT  149<H>  /  AlkPhos  830<H>      LIVER FUNCTIONS - ( 05 Sep 2017 07:12 )  Alb: 2.3 g/dL / Pro: 5.1 g/dL / ALK PHOS: 830 U/L / ALT: 149 U/L RC / AST: 179 U/L / GGT: x                   Imaging:

## 2017-09-06 NOTE — CONSULT NOTE ADULT - SUBJECTIVE AND OBJECTIVE BOX
HPI:  83F PMH COPD (on home 2L 02), bipolar disorder, htn, gerd, hypothyroidism presents from nursing home w/ worsening jaundice over past two weeks.  Also with light light yellow BMs.  Has had worsening epigastric pain as well as worsening rib and back pain .  Denies fevers, chills, chest pain, nausea, vomiting, cough.  She had labwork done at nursing home 9/1/17 which showed bilirubin 12.2 (9.9 direct), w/ , , alk phos 546 so was sent to [facility] for evaluation.  Reports decreased PO intake the past two days 2/2 no appetite. Denies weight loss.      In the ED VS 98.6 92 100/66 16 99%RA  Labs notable for WBC 11.7 Hgb 10.8, .  BUN 13 Cr 0.91 glucose 115. Alb 3.1 Bili 13.5, 12.5 direct. Alk phos 588      CT A/P w/ common bile duct and moderate intrahepatic biliary dilatation, Pancreatic ductal dilatation, may be in part due to distal common bile duct stone although underlying duodenal/ampullary neoplasm cannot be excluded given soft tissue structure in the second portion of the duodenum. This can be further characterized on a follow-up MRI/MRCP. Pancreatic ductal dilatation and multiple indeterminate pancreatic lesions, which may be in part due to IPMN and can also be characterized  on a follow-up MRI/MRCP.   Nonspecific mild peripancreatic stranding. Recommend correlation with serum lipase to assess acute pancreatitis.  Indeterminate bilateral adnexal lesions. Borderline endometrium. Endometrial/ovarian neoplasm cannot be excluded. Recommend follow-up.  Bladder wall thickening and mucosal enhancement. Recommend correlation with urinalysis to assess cystitis.  Elevated right hemidiaphragm with right lower lobe atelectasis. Nonspecific patchy opacity in the left lower lobe. Recommend clinical correlation to assess pneumonia and follow-up to assess resolution.    In the ED, given morphine and cefepime x 1. (02 Sep 2017 04:24)      PERTINENT PM/SXH:   Bipolar disorder  Hx of rheumatoid arthritis  Cellulitis  Schizophrenia  Parkinson's Disease  HTN (Hypertension)  Hypothyroidism    S/P Endoscopy  History of Tonsillectomy  HTN (Hypertension)    SOCIAL HISTORY:   Significant other/partner:  [ ]YES  [ ]NO*  Children:  [x ]YES  [ ]NO*  Episcopal/Spirituality:  Substance hx:  [ ]YES  [ x]NO*  Tobacco hx:  [ ]YES  [ x]NO*  Alcohol hx: [ ]YES  [x ]NO*    Home Opioid hx:  [ ]YES  [ x]NO   Living Situation: [ ]Home  [x ]Long term care  [facility] [ ]Rehab [ ]Other    FAMILY HISTORY:  No pertinent family history in first degree relatives    [ ]Family history non-contributory     BASELINE (I)ADLs (prior to admission): wheelchair bound most of the time however uses walker twice a day  [place]: [ ]total  [ ] moderate [x ]dependent    ADVANCE DIRECTIVES:    DNR Yes not DNI    MOLST  [ x]YES [ ]NO                      [ ]Completed  Health Care Proxy [ ]YES  [ ]NO                   [ ]Completed  Living Will  [ ]YES [ ]NO           [ ]Surrogate  [ ]HCP  [ ]Guardian:  Phone#:    Allergies    penicillin (Unknown)    Intolerances      MEDICATIONS  (STANDING):  diVALproex  milliGRAM(s) Oral at bedtime  diltiazem    milliGRAM(s) Oral daily  diVALproex  milliGRAM(s) Oral daily  QUEtiapine 25 milliGRAM(s) Oral daily  QUEtiapine 50 milliGRAM(s) Oral at bedtime  nystatin Powder 1 Application(s) Topical two times a day  levothyroxine 50 MICROGram(s) Oral daily  enoxaparin Injectable 40 milliGRAM(s) SubCutaneous daily  metroNIDAZOLE    Tablet 500 milliGRAM(s) Oral every 8 hours  cefepime  IVPB   IV Intermittent   cefepime  IVPB 1000 milliGRAM(s) IV Intermittent every 8 hours  sodium chloride 0.9%. 1000 milliLiter(s) (75 mL/Hr) IV Continuous <Continuous>    MEDICATIONS  (PRN):  HYDROmorphone  Injectable 0.5 milliGRAM(s) IV Push every 4 hours PRN moderate to severe pain (4-10)      PRESENT SYMPTOMS:  Source: [x ]Patient   [ ]Family   [ ]Team     Pain:                     [ ]No [ x]Yes             [ ]Mild [ ]Moderate [x ]Severe  Onset - 2 weeks  Location -  right upper quadrant   Duration - intermittent  Character - deep aching  Alleviating/Aggravating - hydromorphone  Radiation - none  Timing - intermittent    Dyspnea:                [x ]No [ ]Yes             [ ]Mild [ ]Moderate [ ]Severe  Anxiety:                  [x ]No [ ]Yes             [ ]Mild [ ]Moderate [ ]Severe  Fatigue:                  [ ]No [x ]Yes             [ ]Mild [ ]Moderate [ ]Severe  Nausea:                  [x ]No [ ]Yes             [ ]Mild [ ]Moderate [ ]Severe  Loss of appetite:   [ ]No [ x]Yes             [ ]Mild [ ]Moderate [ ]Severe  Constipation:        [ ]No [x ]Yes             [ ]Mild [ ]Moderate [ ]Severe    Other Symptoms:  [ ]All other review of systems negative   [ ]Unable to obtain due to poor mentation     Karnofsky Performance Score/Palliative Performance Status Version 2:     40    %  PHYSICAL EXAM:  Vital Signs Last 24 Hrs  T(C): 36.4 (06 Sep 2017 04:20), Max: 36.7 (05 Sep 2017 15:05)  T(F): 97.5 (06 Sep 2017 04:20), Max: 98.1 (05 Sep 2017 21:55)  HR: 76 (06 Sep 2017 04:20) (74 - 79)  BP: 95/61 (06 Sep 2017 04:20) (95/61 - 112/71)  BP(mean): --  RR: 18 (06 Sep 2017 04:20) (18 - 18)  SpO2: 99% (06 Sep 2017 04:20) (97% - 99%) I&O's Summary    05 Sep 2017 07:01  -  06 Sep 2017 07:00  --------------------------------------------------------  IN: 1100 mL / OUT: 0 mL / NET: 1100 mL      General:  [ ]Alert  [ ]Oriented x 2   [x ]Lethargic  [ ]Agitated   [ ]Cachexia   [ ]Unarousable  [x ]Verbal  [ ]Non-Verbal    HEENT:  [ ]Normal   [ ]Dry mouth   [ ]ET Tube/Trach  [ ]Oral lesions  Scleral Icterus and Jaundiced skin    Lungs:   [ x]Clear [ ]Tachypnea  [ ]Audible excessive secretions   [ ]Rhonchi        [ ]Right [ ]Left [ ]Bilateral  [ ]Crackles        [ ]Right [ ]Left [ ]Bilateral  [ ]Wheezing     [ ]Right [ ]Left [ ]Bilateral    Cardiovascular:  [ x]Regular [ ]Irregular [ ]Tachycardia  [ ]Bradycardia  [ ]Murmur [ ]Other  Abdomen: [x ]Soft  [ x]Distended   [x ]+BS  [ ]Non tender [x ]Tender  [ ]PEG/]OGT/ NGT   Last BM:   doesn't recall, but +flatus  Genitourinary: [x ]Normal [  Incontinent   [ ]Oliguria/Anuria   [ ]Jimenes  Musculoskeletal:  [ x]Normal   [ ]Weakness  [ ]Bedbound/Wheelchair bound [ ]Edema  Neurological: [x ]No focal deficits  [ ] Cognitive impairment  [ ] Dysphagia [ ]Dysarthria [ ] Paresis [ ]Other     Skin: [ x ]Jaundiced  [ ]Pressure ulcer(s)  [ ]Rash    LABS:                        9.6    8.6   )-----------( 277      ( 05 Sep 2017 07:12 )             28.9     09-05    140  |  99  |  15  ----------------------------<  106<H>  4.0   |  32<H>  |  0.64    Ca    10.0      05 Sep 2017 07:12  Phos  2.1     09-05  Mg     2.2     09-05    TPro  5.1<L>  /  Alb  2.3<L>  /  TBili  16.4<H>  /  DBili  x   /  AST  179<H>  /  ALT  149<H>  /  AlkPhos  830<H>  09-05        Oral Intake: [ ]Unable/mouth care only [ ]Minimal [x ]Moderate [ ]Full Capability  Diet: [ ]NPO [ ]Tube feeds [ ]TPN [ ]Mechanical Soft     RADIOLOGY & ADDITIONAL STUDIES:    REFERRALS:   [ ]Chaplaincy  [ x] Hospice  [ ]Child Life  [ x]Social Work  [ ]Case management [ ]Holistic Therapy HPI:  83F PMH COPD (on home 2L 02), bipolar disorder, htn, gerd, hypothyroidism presents from nursing home w/ worsening jaundice over past two weeks.  Also with light light yellow BMs.  Has had worsening epigastric pain as well as worsening rib and back pain .  Denies fevers, chills, chest pain, nausea, vomiting, cough.  She had labwork done at nursing home 9/1/17 which showed bilirubin 12.2 (9.9 direct), w/ , , alk phos 546 so was sent to [facility] for evaluation.  Reports decreased PO intake the past two days 2/2 no appetite. Denies weight loss.      In the ED VS 98.6 92 100/66 16 99%RA  Labs notable for WBC 11.7 Hgb 10.8, .  BUN 13 Cr 0.91 glucose 115. Alb 3.1 Bili 13.5, 12.5 direct. Alk phos 588      CT A/P w/ common bile duct and moderate intrahepatic biliary dilatation, Pancreatic ductal dilatation, may be in part due to distal common bile duct stone although underlying duodenal/ampullary neoplasm cannot be excluded given soft tissue structure in the second portion of the duodenum. This can be further characterized on a follow-up MRI/MRCP. Pancreatic ductal dilatation and multiple indeterminate pancreatic lesions, which may be in part due to IPMN and can also be characterized  on a follow-up MRI/MRCP.   Nonspecific mild peripancreatic stranding. Recommend correlation with serum lipase to assess acute pancreatitis.  Indeterminate bilateral adnexal lesions. Borderline endometrium. Endometrial/ovarian neoplasm cannot be excluded. Recommend follow-up.  Bladder wall thickening and mucosal enhancement. Recommend correlation with urinalysis to assess cystitis.  Elevated right hemidiaphragm with right lower lobe atelectasis. Nonspecific patchy opacity in the left lower lobe. Recommend clinical correlation to assess pneumonia and follow-up to assess resolution.    In the ED, given morphine and cefepime x 1. (02 Sep 2017 04:24)    We are called for GOC      PERTINENT PM/SXH:   Bipolar disorder  Hx of rheumatoid arthritis  Cellulitis  Schizophrenia  Parkinson's Disease  HTN (Hypertension)  Hypothyroidism    S/P Endoscopy  History of Tonsillectomy  HTN (Hypertension)    SOCIAL HISTORY:   Significant other/partner:  [ ]YES  [ x]NO*  Children:  [x ]YES  [ ]NO*  Yarsanism/Spirituality:  Substance hx:  [ ]YES  [ x]NO*  Tobacco hx:  [ ]YES  [ x]NO*  Alcohol hx: [ ]YES  [x ]NO*    Home Opioid hx:  [ ]YES  [ x]NO   Living Situation: [ ]Home  [x ]Long term care  [facility] [ ]Rehab [ ]Other    FAMILY HISTORY:  No pertinent family history in first degree relatives    [ x]Family history non-contributory     BASELINE (I)ADLs (prior to admission): wheelchair bound most of the time however uses walker twice a day  [place]: [ ]total  [ ] moderate [x ]dependent    ADVANCE DIRECTIVES:    DNR Yes not DNI    MOLST  [ x]YES [ ]NO                      [ ]Completed  Health Care Proxy [ ]YES  [ ]NO                   [ ]Completed  Living Will  [ ]YES [ ]NO           [ ]Surrogate  [ ]HCP  [ ]Guardian:  Phone#:    Allergies    penicillin (Unknown)    Intolerances      MEDICATIONS  (STANDING):  diVALproex  milliGRAM(s) Oral at bedtime  diltiazem    milliGRAM(s) Oral daily  diVALproex  milliGRAM(s) Oral daily  QUEtiapine 25 milliGRAM(s) Oral daily  QUEtiapine 50 milliGRAM(s) Oral at bedtime  nystatin Powder 1 Application(s) Topical two times a day  levothyroxine 50 MICROGram(s) Oral daily  enoxaparin Injectable 40 milliGRAM(s) SubCutaneous daily  metroNIDAZOLE    Tablet 500 milliGRAM(s) Oral every 8 hours  cefepime  IVPB   IV Intermittent   cefepime  IVPB 1000 milliGRAM(s) IV Intermittent every 8 hours  sodium chloride 0.9%. 1000 milliLiter(s) (75 mL/Hr) IV Continuous <Continuous>    MEDICATIONS  (PRN):  HYDROmorphone  Injectable 0.5 milliGRAM(s) IV Push every 4 hours PRN moderate to severe pain (4-10)      PRESENT SYMPTOMS:  Source: [x ]Patient   [ ]Family   [ ]Team     Pain:                     [ ]No [ x]Yes             [ ]Mild [ ]Moderate [x ]Severe  Onset - 2 weeks  Location -  right upper quadrant   Duration - intermittent  Character - deep aching  Alleviating/Aggravating - hydromorphone  Radiation - none  Timing - intermittent    Dyspnea:                [x ]No [ ]Yes             [ ]Mild [ ]Moderate [ ]Severe  Anxiety:                  [x ]No [ ]Yes             [ ]Mild [ ]Moderate [ ]Severe  Fatigue:                  [ ]No [x ]Yes             [ ]Mild [ ]Moderate [ ]Severe  Nausea:                  [x ]No [ ]Yes             [ ]Mild [ ]Moderate [ ]Severe  Loss of appetite:   [ ]No [ x]Yes             [ ]Mild [ ]Moderate [ ]Severe  Constipation:        [ ]No [x ]Yes             [ ]Mild [ ]Moderate [ ]Severe    Other Symptoms:  [x ]All other review of systems negative   [ ]Unable to obtain due to poor mentation     Karnofsky Performance Score/Palliative Performance Status Version 2:     40    %  PHYSICAL EXAM:  Vital Signs Last 24 Hrs  T(C): 36.4 (06 Sep 2017 04:20), Max: 36.7 (05 Sep 2017 15:05)  T(F): 97.5 (06 Sep 2017 04:20), Max: 98.1 (05 Sep 2017 21:55)  HR: 76 (06 Sep 2017 04:20) (74 - 79)  BP: 95/61 (06 Sep 2017 04:20) (95/61 - 112/71)  BP(mean): --  RR: 18 (06 Sep 2017 04:20) (18 - 18)  SpO2: 99% (06 Sep 2017 04:20) (97% - 99%) I&O's Summary    05 Sep 2017 07:01  -  06 Sep 2017 07:00  --------------------------------------------------------  IN: 1100 mL / OUT: 0 mL / NET: 1100 mL      General:  [ ]Alert  [ ]Oriented x 2   [x ]Lethargic  [ ]Agitated   [ ]Cachexia   [ ]Unarousable  [x ]Verbal  [ ]Non-Verbal    HEENT:  [ ]Normal   [ ]Dry mouth   [ ]ET Tube/Trach  [ ]Oral lesions  Scleral Icterus and Jaundiced skin    Lungs:   [ x]Clear [ ]Tachypnea  [ ]Audible excessive secretions   [ ]Rhonchi        [ ]Right [ ]Left [ ]Bilateral  [ ]Crackles        [ ]Right [ ]Left [ ]Bilateral  [ ]Wheezing     [ ]Right [ ]Left [ ]Bilateral    Cardiovascular:  [ x]Regular [ ]Irregular [ ]Tachycardia  [ ]Bradycardia  [ ]Murmur [ ]Other  Abdomen: [x ]Soft  [ x]Distended   [x ]+BS  [ ]Non tender [x ]Tender  [ ]PEG/]OGT/ NGT   Last BM:   doesn't recall, but +flatus  Genitourinary: [x ]Normal [  Incontinent   [ ]Oliguria/Anuria   [ ]Jimenes  Musculoskeletal:  [ x]Normal   [ ]Weakness  [ ]Bedbound/Wheelchair bound [ ]Edema  Neurological: [x ]No focal deficits  [ ] Cognitive impairment  [ ] Dysphagia [ ]Dysarthria [ ] Paresis [ ]Other     Skin: [ x ]Jaundiced  [ ]Pressure ulcer(s)  [ ]Rash    LABS:                        9.6    8.6   )-----------( 277      ( 05 Sep 2017 07:12 )             28.9     09-05    140  |  99  |  15  ----------------------------<  106<H>  4.0   |  32<H>  |  0.64    Ca    10.0      05 Sep 2017 07:12  Phos  2.1     09-05  Mg     2.2     09-05    TPro  5.1<L>  /  Alb  2.3<L>  /  TBili  16.4<H>  /  DBili  x   /  AST  179<H>  /  ALT  149<H>  /  AlkPhos  830<H>  09-05        Oral Intake: [ ]Unable/mouth care only [ ]Minimal [x ]Moderate [ ]Full Capability  Diet: [ ]NPO [ ]Tube feeds [ ]TPN [ ]Mechanical Soft     RADIOLOGY & ADDITIONAL STUDIES:    REFERRALS:   [ ]Chaplaincy  [ x] Hospice  [ ]Child Life  [ x]Social Work  [ ]Case management [ ]Holistic Therapy

## 2017-09-06 NOTE — CONSULT NOTE ADULT - PROBLEM SELECTOR RECOMMENDATION 4
At this time, patient is unable to appreciate the possibility of malignancy as a differential diagnosis of her symptoms. She is not a candidate for further evaluation with ECRP or symptomatic treatment with perc dianelys by IR. Primary team is considering discharge with hospice. Unable to contact son Jason to discuss patients condition. She has poor baseline function (PPSV 40%), and is a long term resident of [facility] for many years. Patient believes that she has gallstones and is frustrated that the primary team is concerned about the possibility of pancreatic mass and unable to definitively diagnose the reason for this suspected mass. At this time, patient is unable to appreciate the possibility of malignancy as a differential diagnosis of her symptoms.  We have left message with son Jason to set up a family meeting to discuss GOC and depending on outcome, hospice.

## 2017-09-06 NOTE — CONSULT NOTE ADULT - ASSESSMENT
83F PMH COPD (on home 2L 02), bipolar disorder, htn, gerd, hypothyroidism presents from nursing home w/ worsening jaundice, e w/ labs suggestive of obstructive juandice which may be 2/2 biliary/pancreatic malignancy vs gallstones, not candidate of invasive diagostic/treatment procedures.  We are called for GOC/ pain management.

## 2017-09-07 NOTE — PROGRESS NOTE ADULT - PROBLEM SELECTOR PLAN 1
-Patient's mental status has worsened on 9/7; she still arouses to voice, but is no longer answering questions. She is also significantly more tender to palpation.   -Patient placed on dilaudid gtt and status changed to DNI as per her son who is HCP  - CT a/p shows common bile duct, intrahepatic, as well as pancreatic duct dilation, may be 2/2 distal common bile duct stone vs. pancreatic mass (multiple indeterminate pancreatic masses visualized).  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   -IR also declined perc dianelys placement  -Given lack of possible interventions, palliative has been consulted and is on board. Pt's HCP is agreeable to palliative care  - neg blood culture so far  -Per ID, pt does not seen to currently present with classic cholangitis, abx d/emperatriz on 9/7

## 2017-09-07 NOTE — PROGRESS NOTE ADULT - PROBLEM SELECTOR PLAN 1
Patients condition has changed rapidly necessitating redirect to full comfort care after discussion with the Son Jason, who is the health care proxy.  Patient now on a dilaudid drip with PRN rescue doses.

## 2017-09-07 NOTE — GOALS OF CARE CONVERSATION - PERSONAL ADVANCE DIRECTIVE - CONVERSATION DETAILS
Patient more lethargic today, no longer has capacity.  Declining, actively dying.  Spoke with son Jason Muniz by telephone [phone].  Agreed to comfort care measures, DNR AND DNI, hydromorphone infusion.  Switched order to include DNI and filled updated DNR/DNI form in chart.

## 2017-09-07 NOTE — PROGRESS NOTE ADULT - PROBLEM SELECTOR PLAN 3
- Bladder wall thickening noted CT, pt denies any urinary symptoms  - Pt received cefapime from 9/3-9/7

## 2017-09-07 NOTE — PROGRESS NOTE ADULT - SUBJECTIVE AND OBJECTIVE BOX
Overnight Events: No acute events ON.     REVIEW OF SYSTEMS:  Unable to assess due to poor mentation.     VITAL SIGNS:  Vital Signs Last 24 Hrs  T(C): 36.8 (07 Sep 2017 04:54), Max: 37 (06 Sep 2017 21:43)  T(F): 98.2 (07 Sep 2017 04:54), Max: 98.6 (06 Sep 2017 21:43)  HR: 82 (07 Sep 2017 04:54) (74 - 84)  BP: 113/73 (07 Sep 2017 04:54) (99/69 - 131/81)  BP(mean): --  RR: 18 (07 Sep 2017 04:54) (18 - 18)  SpO2: 98% (07 Sep 2017 04:54) (96% - 98%)    PHYSICAL EXAM:   GENERAL: Ill-appearing, severely icteric  RESPIRATORY: LCTAB/L, no rhonchi, rales, or wheezing  CARDIOVASCULAR: RRR, no murmurs, gallops, rubs  ABDOMINAL: firm, distended, very tender to palpation, bowel sounds present  EXTREMITIES: 2+ LE edema b/l, tender to palpation  NEUROLOGICAL: alert and oriented x 1  SKIN: no rashes or lesions   MUSCULOSKELETAL: no gross joint deformity                CAPILLARY BLOOD GLUCOSE        I&O's Summary    06 Sep 2017 07:01  -  07 Sep 2017 07:00  --------------------------------------------------------  IN: 240 mL / OUT: 0 mL / NET: 240 mL        MEDICATIONS  (STANDING):  nystatin Powder 1 Application(s) Topical two times a day  HYDROmorphone Infusion 0.5 mG/Hr (0.5 mL/Hr) IV Continuous <Continuous>  sodium chloride 0.9%. 1000 milliLiter(s) (10 mL/Hr) IV Continuous <Continuous>

## 2017-09-07 NOTE — PROGRESS NOTE ADULT - SUBJECTIVE AND OBJECTIVE BOX
HPI:  83F PMH COPD (on home 2L 02), bipolar disorder, htn, gerd, hypothyroidism presents from nursing home w/ worsening jaundice over past two weeks.  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  We are called for GOC.      PERTINENT PM/SXH:   Bipolar disorder  Hx of rheumatoid arthritis  Cellulitis  Schizophrenia  Parkinson's Disease  HTN (Hypertension)  Hypothyroidism    S/P Endoscopy  History of Tonsillectomy  HTN (Hypertension)    SOCIAL HISTORY:   Significant other/partner:  [ ]YES  [ x]NO*  Children:  [x ]YES  [ ]NO*  Zoroastrian/Spirituality:  Substance hx:  [ ]YES  [ x]NO*  Tobacco hx:  [ ]YES  [ x]NO*  Alcohol hx: [ ]YES  [x ]NO*    Home Opioid hx:  [ ]YES  [ x]NO   Living Situation: [ ]Home  [x ]Long term care  [facility] [ ]Rehab [ ]Other    FAMILY HISTORY:  No pertinent family history in first degree relatives    [ x]Family history non-contributory     BASELINE (I)ADLs (prior to admission): wheelchair bound most of the time however uses walker twice a day  [place]: [ ]total  [ ] moderate [x ]dependent    ADVANCE DIRECTIVES:    DNR Yes not DNI    MOLST  [ x]YES [ ]NO                      [ ]Completed  Health Care Proxy [ ]YES  [ ]NO                   [ ]Completed  Living Will  [ ]YES [ ]NO           [ ]Surrogate  [ ]HCP  [ ]Guardian:  Phone#:    Allergies    penicillin (Unknown)    Intolerances    MEDICATIONS  (STANDING):  nystatin Powder 1 Application(s) Topical two times a day  HYDROmorphone Infusion 0.5 mG/Hr (0.5 mL/Hr) IV Continuous <Continuous>  sodium chloride 0.9%. 1000 milliLiter(s) (10 mL/Hr) IV Continuous <Continuous>    MEDICATIONS  (PRN):  HYDROmorphone  Injectable 0.5 milliGRAM(s) IV Push every 4 hours PRN moderate to severe pain (4-10)        PRESENT SYMPTOMS:  Source: [x ]Patient   [ ]Family   [ ]Team     Pain:                     [ ]No [ x]Yes             [ ]Mild [ ]Moderate [x ]Severe  Onset - 2 weeks  Location -  right upper quadrant   Duration - intermittent  Character - deep aching  Alleviating/Aggravating - hydromorphone  Radiation - none  Timing - intermittent    Dyspnea:                [x ]No [ ]Yes             [ ]Mild [ ]Moderate [ ]Severe  Anxiety:                  [x ]No [ ]Yes             [ ]Mild [ ]Moderate [ ]Severe  Fatigue:                  [ ]No [x ]Yes             [ ]Mild [ ]Moderate [ x]Severe  Nausea:                  [x ]No [ ]Yes             [ ]Mild [ ]Moderate [ ]Severe  Loss of appetite:   [ ]No [ x]Yes             [ ]Mild [ ]Moderate [ x]Severe  Constipation:        [ ]No [x ]Yes             [ ]Mild [x ]Moderate [ ]Severe    Other Symptoms:  [x ]All other review of systems negative   [ ]Unable to obtain due to poor mentation     Karnofsky Performance Score/Palliative Performance Status Version 2:    20 %  PHYSICAL EXAM:    Vital Signs Last 24 Hrs  T(C): 36.8 (07 Sep 2017 04:54), Max: 37 (06 Sep 2017 21:43)  T(F): 98.2 (07 Sep 2017 04:54), Max: 98.6 (06 Sep 2017 21:43)  HR: 82 (07 Sep 2017 04:54) (76 - 84)  BP: 113/73 (07 Sep 2017 04:54) (99/69 - 131/81)  BP(mean): --  RR: 18 (07 Sep 2017 04:54) (18 - 18)  SpO2: 98% (07 Sep 2017 04:54) (96% - 98%)      General:  [ ]Alert  [ ]Oriented x 0   [x ]Lethargic  [ ]Agitated   [ ]Cachexia   [ ]Unarousable  [ ]Verbal    [ x]Non-Verbal    HEENT:  [ ]Normal   [x ]Dry mouth   [ ]ET Tube/Trach  [ ]Oral lesions  Scleral Icterus and Jaundiced skin    Lungs:   [ x]Clear [ ]Tachypnea  [ ]Audible excessive secretions   [ ]Rhonchi        [ ]Right [ ]Left [ ]Bilateral  [ ]Crackles        [ ]Right [ ]Left [ ]Bilateral  [ ]Wheezing     [ ]Right [ ]Left [ ]Bilateral    Cardiovascular:  [ x]Regular [ ]Irregular [ ]Tachycardia  [ ]Bradycardia  [ ]Murmur [ ]Other  Abdomen: [x ]Soft  [ x]Distended   [x ]+BS  [ ]Non tender [x ]Tender  [ ]PEG/]OGT/ NGT   Last BM:   doesn't recall, but +flatus  Genitourinary: [x ]Normal [  Incontinent   [ ]Oliguria/Anuria   [ ]Jimenes  Musculoskeletal:  [ x]Normal   [ ]Weakness  [ ]Bedbound/Wheelchair bound [ ]Edema  Neurological: [x ]No focal deficits  [ ] Cognitive impairment  [ ] Dysphagia [ ]Dysarthria [ ] Paresis [ ]Other     Skin: [ x ]Jaundiced  [ ]Pressure ulcer(s)  [ ]Rash    LABS:  None today    Oral Intake: [x ]Unable/mouth care only [ ]Minimal [x ]Moderate [ ]Full Capability  Diet: [ ]NPO [ ]Tube feeds [ ]TPN [ ]Mechanical Soft     RADIOLOGY & ADDITIONAL STUDIES: Reviewed    REFERRALS:   [ ]Chaplaincy  [ x] Hospice  [ ]Child Life  [ x]Social Work  [ ]Case management [ ]Holistic Therapy

## 2017-09-07 NOTE — PROGRESS NOTE ADULT - SUBJECTIVE AND OBJECTIVE BOX
INFECTIOUS DISEASES FOLLOW UP--Harry Vu MD  Pager [phone]    This is a follow up note for this  83y Female with  biliary obstruction.  Overall frail.    Further ROS: limited  CONSTITUTIONAL:  No fever  CARDIOVASCULAR:  No chest pain or palpitations  RESPIRATORY:  No dyspnea    Allergies  penicillin (Unknown)    ANTIBIOTICS/RELEVANT:  antimicrobials  metroNIDAZOLE    Tablet 500 milliGRAM(s) Oral every 8 hours  cefepime  IVPB   IV Intermittent   cefepime  IVPB 1000 milliGRAM(s) IV Intermittent every 8 hours    OTHER:  HYDROmorphone  Injectable 0.5 milliGRAM(s) IV Push every 4 hours PRN  diVALproex  milliGRAM(s) Oral at bedtime  diltiazem    milliGRAM(s) Oral daily  diVALproex  milliGRAM(s) Oral daily  QUEtiapine 25 milliGRAM(s) Oral daily  QUEtiapine 50 milliGRAM(s) Oral at bedtime  nystatin Powder 1 Application(s) Topical two times a day  levothyroxine 50 MICROGram(s) Oral daily  enoxaparin Injectable 40 milliGRAM(s) SubCutaneous daily  sodium chloride 0.9%. 1000 milliLiter(s) IV Continuous <Continuous>      Objective:  Vital Signs Last 24 Hrs  T(C): 36.8 (07 Sep 2017 04:54), Max: 37 (06 Sep 2017 21:43)  T(F): 98.2 (07 Sep 2017 04:54), Max: 98.6 (06 Sep 2017 21:43)  HR: 82 (07 Sep 2017 04:54) (74 - 84)  BP: 113/73 (07 Sep 2017 04:54) (99/69 - 131/81)  BP(mean): --  RR: 18 (07 Sep 2017 04:54) (18 - 18)  SpO2: 98% (07 Sep 2017 04:54) (96% - 98%)    PHYSICAL EXAM:  Constitutional:no acute distress--quite chronically ill seeming.  Eyes:WM, EOMI  Ear/Nose/Throat: no oral lesions, 	  Respiratory: clear BL  Cardiovascular: S1S2  Gastrointestinal:soft, (+) BS, no tenderness  Extremities:no e/e/c  No Lymphadenopathy  IV sites not inflammed.

## 2017-09-07 NOTE — PROGRESS NOTE ADULT - PROBLEM SELECTOR PLAN 10
- MOLST form in chart, pt DNR and also made DNI on 9/ - MOLST form in chart, pt DNR and also made DNI.   - Dilaudid drip for comfort  - Possible transfer to PCU

## 2017-09-08 NOTE — PROGRESS NOTE ADULT - PROBLEM SELECTOR PLAN 1
-Pt with impending demise  -Patient's mental status has worsened on 9/7; she still arouses to voice, but is no longer answering questions. She is also significantly more tender to palpation.   -Patient placed on dilaudid gtt and status changed to DNI as per her son who is HCP  -Dilaudid rate increased to 1.5

## 2017-09-08 NOTE — PROGRESS NOTE ADULT - PROBLEM SELECTOR PLAN 10
- MOLST form in chart, pt DNR and also made DNI.   - Dilaudid drip for comfort  - Possible transfer to PCU

## 2017-09-08 NOTE — PROGRESS NOTE ADULT - SUBJECTIVE AND OBJECTIVE BOX
HPI/OVERNIGHT EVENTS:  83 year old female with jaundice/abdominal pain - new mass, full comfort care.  Son at bedside.  He acknowledges that she looks comfortable.    PERTINENT PM/SXH:   Bipolar disorder  Hx of rheumatoid arthritis  Cellulitis  Schizophrenia  Parkinson's Disease  HTN (Hypertension)  Hypothyroidism    S/P Endoscopy  History of Tonsillectomy  HTN (Hypertension)      FAMILY HISTORY:  No pertinent family history in first degree relatives    [ ]Family history non-contributory     BASELINE (I)ADLs (prior to admission):  NHR  [place]: [ ]total  [x ] moderate [ ]dependent    ADVANCE DIRECTIVES:    DNR Yes      Allergies    penicillin (Unknown)    Intolerances      MEDICATIONS  (STANDING):  sodium chloride 0.9%. 1000 milliLiter(s) (10 mL/Hr) IV Continuous <Continuous>  nystatin Powder 1 Application(s) Topical two times a day  HYDROmorphone Infusion 0.7 mG/Hr (0.7 mL/Hr) IV Continuous <Continuous>    MEDICATIONS  (PRN):  acetaminophen  Suppository 650 milliGRAM(s) Rectal every 6 hours PRN For Temp greater than 38 C (100.4 F)  glycopyrrolate Injectable 0.4 milliGRAM(s) IV Push every 6 hours PRN Secretions  bisacodyl Suppository 10 milliGRAM(s) Rectal daily PRN Constipation  LORazepam   Injectable 0.5 milliGRAM(s) IV Push every 6 hours PRN Anxiety  HYDROmorphone  Injectable 1.4 milliGRAM(s) IV Push every 1 hour PRN dyspnea      PRESENT SYMPTOMS:  Source: [ ]Patient   [x ]Family   [ ]Team     Pain:                        [x ]No [ ]Yes             [ ]Mild [ ]Moderate [ ]Severe  Onset -  Location -  Duration -  Character -  Alleviating/Aggravating -  Radiation -  Timing -    Dyspnea:                [ x]No [ ]Yes             [ ]Mild [ ]Moderate [ ]Severe  Anxiety:                  [ x]No [ ]Yes             [ ]Mild [ ]Moderate [ ]Severe  Fatigue:                  [x ]No [ ]Yes             [ ]Mild [ ]Moderate [ ]Severe  Nausea:                  [x ]No [ ]Yes             [ ]Mild [ ]Moderate [ ]Severe  Loss of appetite:   [ ]No [ x]Yes             [ ]Mild [ ]Moderate [ ]Severe  Constipation:        [ x]No [ ]Yes             [ ]Mild [ ]Moderate [ ]Severe    Other Symptoms:  [ x]All other review of systems negative   [ ]Unable to obtain due to poor mentation     Karnofsky Performance Score/Palliative Performance Status Version 2:  20       %  PHYSICAL EXAM:  Vital Signs Last 24 Hrs  T(C): 36.8 (06 Sep 2017 13:44), Max: 36.8 (06 Sep 2017 13:44)  T(F): 98.3 (06 Sep 2017 13:44), Max: 98.3 (06 Sep 2017 13:44)  HR: 74 (06 Sep 2017 13:44) (74 - 79)  BP: 103/67 (06 Sep 2017 13:44) (95/61 - 112/71)  BP(mean): --  RR: 18 (06 Sep 2017 13:44) (18 - 18)  SpO2: 97% (06 Sep 2017 13:44) (97% - 99%) I&O's Summary    05 Sep 2017 07:01  -  06 Sep 2017 07:00  --------------------------------------------------------  IN: 1100 mL / OUT: 0 mL / NET: 1100 mL      General:  [ ]Alert  [ ]Oriented x   [x ]Lethargic  [ ]Agitated   [ ]Cachexia   [ ]Unarousable  [ ]Verbal  [x ]Non-Verbal  Jaundiced    HEENT:  [ ]Normal   x[ ]Dry mouth   [ ]ET Tube/Trach  [ ]Oral lesions    Lungs:   [x ]Clear [ ]Tachypnea  [ ]Audible excessive secretions   [ ]Rhonchi        [ ]Right [ ]Left [ ]Bilateral  [ ]Crackles        [ ]Right [ ]Left [ ]Bilateral  [ ]Wheezing     [ ]Right [ ]Left [ ]Bilateral    Cardiovascular:  x[ ]Regular [ ]Irregular [ ]Tachycardia  [ ]Bradycardia  [ ]Murmur [ ]Other +S1 +S2   Abdomen: [x ]Soft  [ ]Distended   [x ]+BS  [ ]Non tender [x ]Tender  [ ]PEG/]OGT/ NGT   Last BM:   see EMR  Genitourinary: [ ]Normal [x  Incontinent   [ ]Oliguria/Anuria   [ ]Jimenes  Musculoskeletal:  [ ]Normal   [x ]Weakness  [ ]Bedbound/Wheelchair bound [ ]Edema  Neurological: [ ]No focal deficits  [x ] Cognitive impairment  [ ] Dysphagia [ ]Dysarthria [ ] Paresis [ ]Other     Skin: [x ]Normal   [ ]Pressure ulcer(s)  [ ]Rash    LABS:   reviewed    Shock: [ ]Septic [ ]Cardiogenic [ ]Neurologic [ ]Hypovolemic  Vasopressors x   Inotrops x     Protein Calorie Malnutrition: [ ]Mild [ ]Moderatw [ ]Severe    Oral Intake: [ ]Unable/mouth care only [ ]Minimal [ ]Moderate [ ]Full Capability  Diet: [ ]NPO [ ]Tube feeds [ ]TPN [ ]Other     RADIOLOGY & ADDITIONAL STUDIES: reviewed    REFERRALS:   [ ]Chaplaincy  [  Hospice  [ ]Child Life  [ ]Social Work  [ ]Case management [ ]Holistic Therapy

## 2017-09-08 NOTE — PROGRESS NOTE ADULT - SUBJECTIVE AND OBJECTIVE BOX
Overnight Events: Pt with worsening pain ON.     REVIEW OF SYSTEMS:  Unable to assess    VITAL SIGNS:  Vital Signs Last 24 Hrs  T(C): 36.7 (08 Sep 2017 12:51), Max: 36.7 (08 Sep 2017 12:51)  T(F): 98 (08 Sep 2017 12:51), Max: 98 (08 Sep 2017 12:51)  HR: 86 (08 Sep 2017 12:51) (79 - 86)  BP: 137/79 (08 Sep 2017 12:51) (92/54 - 137/79)  BP(mean): --  RR: 18 (08 Sep 2017 12:51) (18 - 18)  SpO2: 99% (08 Sep 2017 12:51) (97% - 99%)    PHYSICAL EXAM:   GENERAL: lethargic, groans in pain   RESPIRATORY: LCTAB/L, no rhonchi, rales, or wheezing  CARDIOVASCULAR: RRR, no murmurs, gallops, rubs  ABDOMINAL: firm, distended, significantly tender to palpation   EXTREMITIES: no clubbing, cyanosis, or edema  NEUROLOGICAL: alert and oriented x 0, non-focal  SKIN: no rashes or lesions   MUSCULOSKELETAL: no gross joint deformity                CAPILLARY BLOOD GLUCOSE        I&O's Summary    07 Sep 2017 07:01  -  08 Sep 2017 07:00  --------------------------------------------------------  IN: 126 mL / OUT: 0 mL / NET: 126 mL    08 Sep 2017 07:01  -  08 Sep 2017 14:21  --------------------------------------------------------  IN: 2.5 mL / OUT: 0 mL / NET: 2.5 mL        MEDICATIONS  (STANDING):  nystatin Powder 1 Application(s) Topical two times a day  HYDROmorphone Infusion 1.5 mG/Hr (1.5 mL/Hr) IV Continuous <Continuous>  sodium chloride 0.9%. 1000 milliLiter(s) (10 mL/Hr) IV Continuous <Continuous>

## 2017-09-09 NOTE — PROGRESS NOTE ADULT - PROBLEM SELECTOR PLAN 1
Deemed poor candidate for EUS/ERCP with general anesthesia given significant comorbities.   As per IR - pt poor candidate for IR perc drain as well.   On full comfort care

## 2017-09-09 NOTE — PROGRESS NOTE ADULT - SUBJECTIVE AND OBJECTIVE BOX
Geriatric and Palliative Care Unit Progress Note [x] Hospice Progress Note [ ]     HPI:  83F PMH COPD (on home 2L 02), bipolar disorder, htn, gerd, hypothyroidism presents from nursing home w/ worsening jaundice, e w/ labs suggestive of obstuctive juandice which may be 2/2 biliary/pancreatic malignancy vs gallstones. 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 IR also declined perc dianelys placement. Given lack of possible interventions, palliative has been consulted.  Patient was transferred to the PCU on evening of 9/8 for the institution of full comfort care and use of IV dilaudid infusion for management of pain secondary to neoplasm.      Indication for Palliative Care Unit Services: Symptom control    ADVANCE DIRECTIVES:    DNR [x] YES  [ ] NO                            MOLST  [x] YES [ ] NO                        Health Care Proxy [ ] YES  [x] NO     Living Will  [ ] YES [ ] NO             [x] Surrogate  [ ] HCP  [ ] Guardian:     Shorty Muniz                                                             Phone#: [phone]    Allergies    penicillin (Unknown)      MEDICATIONS  (STANDING):  sodium chloride 0.9%. 1000 milliLiter(s) (10 mL/Hr) IV Continuous <Continuous>  nystatin Powder 1 Application(s) Topical two times a day  HYDROmorphone Infusion 0.7 mG/Hr (0.7 mL/Hr) IV Continuous <Continuous>    MEDICATIONS  (PRN):  acetaminophen  Suppository 650 milliGRAM(s) Rectal every 6 hours PRN For Temp greater than 38 C (100.4 F)  glycopyrrolate Injectable 0.4 milliGRAM(s) IV Push every 6 hours PRN Secretions  bisacodyl Suppository 10 milliGRAM(s) Rectal daily PRN Constipation  LORazepam   Injectable 0.5 milliGRAM(s) IV Push every 6 hours PRN Anxiety  HYDROmorphone  Injectable 1.4 milliGRAM(s) IV Push every 1 hour PRN dyspnea      PRESENT SYMPTOMS:  Source: [ ] Patient   [ ] Family   [x] Team     Pain:                        [x] No [ ] Yes             [ ] Mild [ ] Moderate [ ] Severe    Onset -  Location -  Duration -  Character -  Alleviating/Aggravating -  Radiation -  Timing -      Dyspnea:                [x] No [ ] Yes             [ ] Mild [ ] Moderate [ ] Severe    Anxiety:                  [x] No [ ] Yes             [ ] Mild [ ] Moderate [ ] Severe    Fatigue:                  [ ] No [x] Yes             [ ] Mild [ ] Moderate [ ] Severe    Nausea:                  [x] No [ ] Yes             [ ] Mild [ ] Moderate [ ] Severe    Loss of appetite:   [ ] No [x] Yes   (inferred)          [ ] Mild [ ] Moderate [ ] Severe    Constipation:        [ ] No [x] Yes             [ ] Mild [ ] Moderate [ ] Severe    Other Symptoms:  [ ] All other review of systems negative   [x] Unable to obtain due to poor mentation     Karnofsky Performance Score/Palliative Performance Status Version 2:       10 %    PHYSICAL EXAM:  Vital Signs Last 24 Hrs  T(C): 36.3 (09 Sep 2017 11:54), Max: 36.3 (09 Sep 2017 11:54)  T(F): 97.4 (09 Sep 2017 11:54), Max: 97.4 (09 Sep 2017 11:54)  HR: 974 (09 Sep 2017 11:54) (974 - 974)  BP: 99/65 (09 Sep 2017 11:54) (99/65 - 99/65)  BP(mean): --  RR: 12 (09 Sep 2017 11:54) (12 - 12)  SpO2: 98% (09 Sep 2017 11:54) (98% - 98%) I&O's Summary    08 Sep 2017 07:01  -  09 Sep 2017 07:00  --------------------------------------------------------  IN: 82.5 mL / OUT: 0 mL / NET: 82.5 mL    General:  [ ] Alert  [ ] Oriented x      [x] Lethargic  [ ] Agitated   [ ] Cachexia   [ ] Unarousable  [ ] Verbal  [ ] Non-Verbal    HEENT:  [ ] Normal   [x] Dry mouth   [ ] ET Tube    [ ] Trach  [ ] Oral lesions    Lungs:   [x] Clear [ ] Tachypnea  [ ] Audible excessive secretions   [ ] Rhonchi        [ ] Right [ ] Left [ ] Bilateral  [ ] Crackles        [ ] Right [ ] Left [ ] Bilateral  [ ] Wheezing     [ ] Right [ ] Left [ ] Bilateral  [ ] Respiratory failure [ ] Acute [ ] Chronic [ ] Hypoxemic [ ] Hypercarbic [ ] Other    Cardiovascular:   [x] Regular [ ] Irregular [ ] Tachycardia   [ ] Bradycardia  [ ] Murmur [ ] Other  [ ] Shock [ ] Septic [ ] Hypovolemic [ ] Neurogenic [ ] Cardiogenic   [ ] Vasopressors [ ] Inotrophs    Abdomen:   [x] Soft  [ ] Distended   [ ] +BS  [x] Non tender [ ] Tender  [ ]PEG   [ ]OGT/ NGT   Last BM:     [ ] Other    Genitourinary:  [ ] Normal [x] Incontinent   [ ] Oliguria/Anuria   [ ] Jimenes  [ ] Other       Musculoskeletal:    [ ] Normal   [ ] Weakness  [ ] Edema  [x] Bedbound/Wheelchair bound [ ]  Ambulatory [ ] with [ ] without assistance [ ] Functional quadriplegia    Neurological: [ ] No focal deficits  [ ] Cognitive impairment  [ ] Dysphagia [ ] Dysarthria [ ] Paresis [ ] Other   [ ] Brain compression  [ ] Cerebral edema [x] Encephalopathy    Skin: Jaundiced    LABS: Reviewed    Protein Calorie Malnutrition: [ ] Mild [ ] Moderate [ ] Severe    Oral Intake: [x] Unable/mouth care only [ ] Minimal [ ] Moderate [ ] Full Capability  Diet: [ ] NPO [ ] Tube feeds [ ] TPN [x] Other Mechanical soft    RADIOLOGY & ADDITIONAL STUDIES:    REFERRALS:   [ ] Chaplaincy  [ ] Hospice Care  [ ] Child Life  [ ] Social Work  [ ] Case management [ ] Nutrition [ ] Holistic Therapy [ ] Wound Care [ ]Physical Therapy [ ] Other [ ]See goals of care note in [place]

## 2017-09-11 NOTE — PROGRESS NOTE ADULT - PROBLEM SELECTOR PLAN 1
Deemed poor candidate for EUS/ERCP with general anesthesia given significant comorbidities. Per IR - poor candidate for IR perc drain as well. On full comfort care.

## 2017-09-11 NOTE — PROGRESS NOTE ADULT - SUBJECTIVE AND OBJECTIVE BOX
Geriatric and Palliative Care Unit Progress Note [ ] Hospice Progress Note [ ]     HPI:  83F PMH COPD (on home 2L 02), bipolar disorder, htn, gerd, hypothyroidism presents from nursing home w/ worsening jaundice over past two weeks.  Also with light light yellow BMs.  Has had worsening epigastric pain as well as worsening rib and back pain .  Denies fevers, chills, chest pain, nausea, vomiting, cough.  She had labwork done at nursing home 9/1/17 which showed bilirubin 12.2 (9.9 direct), w/ , , alk phos 546 so was sent to [facility] for evaluation.  Reports decreased PO intake the past two days 2/2 no appetite. Denies weight loss.      In the ED VS 98.6 92 100/66 16 99%RA  Labs notable for WBC 11.7 Hgb 10.8, .  BUN 13 Cr 0.91 glucose 115. Alb 3.1 Bili 13.5, 12.5 direct. Alk phos 588      CT A/P w/ common bile duct and moderate intrahepatic biliary dilatation, Pancreatic ductal dilatation, may be in part due to distal common bile duct   stone although underlying duodenal/ampullary neoplasm cannot be excluded given soft tissue structure in the second portion of the duodenum. This   can be further characterized on a follow-up MRI/MRCP. Pancreatic ductal dilatation and multiple indeterminate pancreatic lesions, which may be in part due to IPMN and can also be characterized  on a follow-up MRI/MRCP.   Nonspecific mild peripancreatic stranding. Recommend correlation with serum lipase to assess acute pancreatitis.  Indeterminate bilateral adnexal lesions. Borderline endometrium. Endometrial/ovarian neoplasm cannot be excluded. Recommend follow-up.  Bladder wall thickening and mucosal enhancement. Recommend correlation with urinalysis to assess cystitis.  Elevated right hemidiaphragm with right lower lobe atelectasis. Nonspecific patchy opacity in the left lower lobe. Recommend clinical correlation to assess pneumonia and follow-up to assess resolution.    In the ED, given morphine and cefepime x 1. (02 Sep 2017 04:24)    Indication for Palliative Care Unit Services:    ADVANCE DIRECTIVES:    DNRYes  Yes    MOLST  [ ] YES [ ] NO                      [ ] Completed  Health Care Proxy [ ] YES  [ ] NO   [ ] Completed  Living Will  [ ] YES [ ] NO             [ ] Surrogate  [ ] HCP  [ ] Guardian:                                                                  Phone#:    Allergies    penicillin (Unknown)    Intolerances        MEDICATIONS  (STANDING):  sodium chloride 0.9%. 1000 milliLiter(s) (10 mL/Hr) IV Continuous <Continuous>  nystatin Powder 1 Application(s) Topical two times a day  HYDROmorphone Infusion 1 mG/Hr (1 mL/Hr) IV Continuous <Continuous>    MEDICATIONS  (PRN):  acetaminophen  Suppository 650 milliGRAM(s) Rectal every 6 hours PRN For Temp greater than 38 C (100.4 F)  glycopyrrolate Injectable 0.4 milliGRAM(s) IV Push every 6 hours PRN Secretions  bisacodyl Suppository 10 milliGRAM(s) Rectal daily PRN Constipation  LORazepam   Injectable 0.5 milliGRAM(s) IV Push every 6 hours PRN Anxiety  HYDROmorphone  Injectable 2 milliGRAM(s) IV Push every 1 hour PRN Pain  HYDROmorphone  Injectable 2 milliGRAM(s) IV Push every 1 hour PRN Dyspnea      PRESENT SYMPTOMS:  Source: [ ] Patient   [ ] Family   [ ] Team     Pain:                        [ ] No [ ] Yes             [ ] Mild [ ] Moderate [ ] Severe    Onset -  Location -  Duration -  Character -  Alleviating/Aggravating -  Radiation -  Timing -      Dyspnea:                [ ] No [ ] Yes             [ ] Mild [ ] Moderate [ ] Severe    Anxiety:                  [ ] No [ ] Yes             [ ] Mild [ ] Moderate [ ] Severe    Fatigue:                  [ ] No [ ] Yes             [ ] Mild [ ] Moderate [ ] Severe    Nausea:                  [ ] No [ ] Yes             [ ] Mild [ ] Moderate [ ] Severe    Loss of appetite:   [ ] No [ ] Yes             [ ] Mild [ ] Moderate [ ] Severe    Constipation:        [ ] No [ ] Yes             [ ] Mild [ ] Moderate [ ] Severe    Other Symptoms:  [ ] All other review of systems negative   [ ] Unable to obtain due to poor mentation     Karnofsky Performance Score/Palliative Performance Status Version 2:         %    PHYSICAL EXAM:  Vital Signs Last 24 Hrs  T(C): --  T(F): --  HR: --  BP: --  BP(mean): --  RR: --  SpO2: -- I&O's Summary    10 Sep 2017 07:01  -  11 Sep 2017 07:00  --------------------------------------------------------  IN: 100 mL / OUT: 0 mL / NET: 100 mL        General:  [ ] Alert  [ ] Oriented x      [ ] Lethargic  [ ] Agitated   [ ] Cachexia   [ ] Unarousable  [ ] Verbal  [ ] Non-Verbal    HEENT:  [ ] Normal   [ ] Dry mouth   [ ] ET Tube    [ ] Trach  [ ] Oral lesions    Lungs:   [ ] Clear [ ] Tachypnea  [ ] Audible excessive secretions   [ ] Rhonchi        [ ] Right [ ] Left [ ] Bilateral  [ ] Crackles        [ ] Right [ ] Left [ ] Bilateral  [ ] Wheezing     [ ] Right [ ] Left [ ] Bilateral  [ ] Respiratory failure [ ] Acute [ ] Chronic [ ] Hypoxemic [ ] Hypercarbic [ ] Other    Cardiovascular:   [ ] Regular [ ] Irregular [ ] Tachycardia   [ ] Bradycardia  [ ] Murmur [ ] Other  [ ] Shock [ ] Septic [ ] Hypovolemic [ ] Neurogenic [ ] Cardiogenic   [ ] Vasopressors [ ] Inotrophs    Abdomen:   [ ] Soft  [ ] Distended   [ ] +BS  [ ] Non tender [ ] Tender  [ ]PEG   [ ]OGT/ NGT   Last BM:     [ ] Other    Genitourinary:  [ ] Normal [ ] Incontinent   [ ] Oliguria/Anuria   [ ] Jimenes  [ ] Other       Musculoskeletal:    [ ] Normal   [ ] Weakness  [ ] Edema  [ ] Bedbound/Wheelchair bound [ ]  Ambulatory [ ] with [ ] without assistance [ ] Functional quadriplegia    Neurological: [ ] No focal deficits  [ ] Cognitive impairment  [ ] Dysphagia [ ] Dysarthria [ ] Paresis [ ] Other   [ ] Brain compression  [ ] Cerebral edema [ ] Encephalopathy    Skin: [ ] Normal   [ ] Ulcer(s) type [ ] Diabetic [ ] Pressure [ ] Other   Stage        POA [ ]  Yes [ ]  No       [ ] Rash    LABS:                Protein Calorie Malnutrition: [ ] Mild [ ] Moderate [ ] Severe    Oral Intake: [ ] Unable/mouth care only [ ] Minimal [ ] Moderate [ ] Full Capability  Diet: [ ] NPO [ ] Tube feeds [ ] TPN [ ] Other     RADIOLOGY & ADDITIONAL STUDIES:    REFERRALS:   [ ] Chaplaincy  [ ] Hospice Care  [ ] Child Life  [ ] Social Work  [ ] Case management [ ] Nutrition [ ] Holistic Therapy [ ] Wound Care [ ]Physical Therapy [ ] Other [ ]See goals of care note in [place] [facility] and Palliative Care Unit Progress Note [ ] Hospice Progress Note [ ]     HPI:  83F PMH COPD (on home 2L 02), bipolar disorder, htn, gerd, hypothyroidism presents from nursing home w/ worsening jaundice over past two weeks.  Also with light light yellow BMs.  Has had worsening epigastric pain as well as worsening rib and back pain .  Denies fevers, chills, chest pain, nausea, vomiting, cough.  She had labwork done at nursing home 9/1/17 which showed bilirubin 12.2 (9.9 direct), w/ , , alk phos 546 so was sent to [facility] for evaluation.  Reports decreased PO intake the past two days 2/2 no appetite. Denies weight loss.  CT A/P w/ common bile duct and moderate intrahepatic biliary dilatation, Pancreatic ductal dilatation, may be in part due to distal common bile duct stone although underlying duodenal/ampullary neoplasm cannot be excluded given soft tissue structure in the second portion of the duodenum. Pancreatic ductal dilatation and multiple indeterminate pancreatic lesions. Patient was admitted for obstructive jaundice. Transferred to PCU for symptom management.    Indication for Palliative Care Unit Services: Symptom management.    ADVANCE DIRECTIVES:    DNR Yes    MOLST  [ ] YES [x ] NO                      [ ] Completed  Health Care Proxy [ ] YES  [x ] NO   [ ] Completed  Living Will  [ ] YES [x ] NO             [ ] Surrogate  [ ] HCP  [ ] Guardian: Jason Muniz (son)  Phone#:    Allergies    penicillin (Unknown)    Intolerances    MEDICATIONS  (STANDING):  sodium chloride 0.9%. 1000 milliLiter(s) (10 mL/Hr) IV Continuous <Continuous>  nystatin Powder 1 Application(s) Topical two times a day  HYDROmorphone Infusion 1 mG/Hr (1 mL/Hr) IV Continuous <Continuous>    MEDICATIONS  (PRN):  acetaminophen  Suppository 650 milliGRAM(s) Rectal every 6 hours PRN For Temp greater than 38 C (100.4 F)  glycopyrrolate Injectable 0.4 milliGRAM(s) IV Push every 6 hours PRN Secretions  bisacodyl Suppository 10 milliGRAM(s) Rectal daily PRN Constipation  LORazepam   Injectable 0.5 milliGRAM(s) IV Push every 6 hours PRN Anxiety  HYDROmorphone  Injectable 2 milliGRAM(s) IV Push every 1 hour PRN Pain  HYDROmorphone  Injectable 2 milliGRAM(s) IV Push every 1 hour PRN Dyspnea      PRESENT SYMPTOMS:  Source: [x ] Patient   [ ] Family   [x ] Team     Pain:                        [x ] No [ ] Yes             [ ] Mild [ ] Moderate [ ] Severe    Dyspnea:                [ ] No [x ] Yes             [ ] Mild [x ] Moderate [ ] Severe    Anxiety:                  [ ] No [x ] Yes             [x ] Mild [ ] Moderate [ ] Severe    Fatigue:                  [x ] No [ ] Yes             [ ] Mild [ ] Moderate [ ] Severe    Nausea:                  [x ] No [ ] Yes             [ ] Mild [ ] Moderate [ ] Severe    Loss of appetite:   [x ] No [ ] Yes             [ ] Mild [ ] Moderate [ ] Severe    Constipation:        [x ] No [ ] Yes             [ ] Mild [ ] Moderate [ ] Severe    Other Symptoms:  [ ] All other review of systems negative   [x ] Unable to obtain due to poor mentation     Karnofsky Performance Score/Palliative Performance Status Version 2: 10  %    PHYSICAL EXAM:  Vital Signs Last 24 Hrs  T(C): --  T(F): --  HR: --  BP: --  BP(mean): --  RR: --  SpO2: -- I&O's Summary    10 Sep 2017 07:01  -  11 Sep 2017 07:00  --------------------------------------------------------  IN: 100 mL / OUT: 0 mL / NET: 100 mL    General:  [ ] Alert  [ ] Oriented x      [x ] Lethargic  [ ] Agitated   [ ] Cachexia   [ ] Unarousable  [ ] Verbal  [ ] Non-Verbal    HEENT:  [ ] Normal   [x ] Dry mouth   [ ] ET Tube    [ ] Trach  [ ] Oral lesions    Lungs:   [x ] Clear [ ] Tachypnea  [ ] Audible excessive secretions   [ ] Rhonchi        [ ] Right [ ] Left [ ] Bilateral  [ ] Crackles        [ ] Right [ ] Left [ ] Bilateral  [ ] Wheezing     [ ] Right [ ] Left [ ] Bilateral  [ ] Respiratory failure [ ] Acute [ ] Chronic [ ] Hypoxemic [ ] Hypercarbic [ ] Other    Cardiovascular:   [x ] Regular [ ] Irregular [ ] Tachycardia   [ ] Bradycardia  [ ] Murmur [ ] Other  [ ] Shock [ ] Septic [ ] Hypovolemic [ ] Neurogenic [ ] Cardiogenic   [ ] Vasopressors [ ] Inotrophs    Abdomen:   [ ] Soft  [x ] Distended   [ ] +BS  [ ] Non tender [x ] Tender  [ ]PEG   [ ]OGT/ NGT   Last BM:     [ ] Other    Genitourinary:  [ ] Normal [ ] Incontinent   [ ] Oliguria/Anuria   [x ] Jimenes  [ ] Other       Musculoskeletal:    [ ] Normal   [x ] Weakness  [ ] Edema  [ ] Bedbound/Wheelchair bound [ ]  Ambulatory [ ] with [ ] without assistance [ ] Functional quadriplegia    Neurological: [ ] No focal deficits  [ ] Cognitive impairment  [ ] Dysphagia [ ] Dysarthria [ ] Paresis [ ] Other   [ ] Brain compression  [ ] Cerebral edema [x ] Encephalopathy    Skin: [ ] Normal   [ ] Ulcer(s) type [ ] Diabetic [ ] Pressure [ ] Other   Stage        POA [ ]  Yes [ ]  No       [ ] Rash    LABS: reviewed.    Protein Calorie Malnutrition: [ ] Mild [x ] Moderate [ ] Severe    Oral Intake: [ ] Unable/mouth care only [ ] Minimal [ ] Moderate [ ] Full Capability  Diet: [ ] NPO [ ] Tube feeds [ ] TPN [ ] Other     RADIOLOGY & ADDITIONAL STUDIES: reviewed    REFERRALS:   [ ] Chaplaincy  [ ] Hospice Care  [ ] Child Life  [ ] Social Work  [ ] Case management [ ] Nutrition [ ] Holistic Therapy [ ] Wound Care [ ]Physical Therapy [ ] Other [ ]See goals of care note in [place]

## 2017-09-12 NOTE — DIETITIAN INITIAL EVALUATION ADULT. - PROBLEM SELECTOR PLAN 1
- CT showing CBC, intrahepatic, and pancreatic duct dilation, could be 2/2 distal common bile duct stone vs. pancreatic mass (multiple indeterminate pancreatic masses visualized).  - will need MRCP to further evaluate  - GI consult in the am as will likely need ERCP  - NPO, lipase to r/o pancreatitis as having significant epigastric pain  - IVF and pain control

## 2017-09-12 NOTE — DIETITIAN INITIAL EVALUATION ADULT. - OTHER INFO
Per discussion with palliative care fellow, pt is inappropriate for nutrition assessment at this time, RD to remain available as needed

## 2017-09-12 NOTE — PROGRESS NOTE ADULT - SUBJECTIVE AND OBJECTIVE BOX
Geriatric and Palliative Care Unit Progress Note [x ] Hospice Progress Note [ ]     HPI:  83F PMH COPD (on home 2L 02), bipolar disorder, htn, gerd, hypothyroidism presents from nursing home w/ worsening jaundice over past two weeks.  Also with light light yellow BMs.  Has had worsening epigastric pain as well as worsening rib and back pain .  Denies fevers, chills, chest pain, nausea, vomiting, cough.  She had labwork done at nursing home 9/1/17 which showed bilirubin 12.2 (9.9 direct), w/ , , alk phos 546 so was sent to [facility] for evaluation.  Reports decreased PO intake the past two days 2/2 no appetite. Denies weight loss.      In the ED VS 98.6 92 100/66 16 99%RA  Labs notable for WBC 11.7 Hgb 10.8, .  BUN 13 Cr 0.91 glucose 115. Alb 3.1 Bili 13.5, 12.5 direct. Alk phos 588      CT A/P w/ common bile duct and moderate intrahepatic biliary dilatation, Pancreatic ductal dilatation, may be in part due to distal common bile duct   stone although underlying duodenal/ampullary neoplasm cannot be excluded given soft tissue structure in the second portion of the duodenum. This   can be further characterized on a follow-up MRI/MRCP. Pancreatic ductal dilatation and multiple indeterminate pancreatic lesions, which may be in part due to IPMN and can also be characterized  on a follow-up MRI/MRCP.   Nonspecific mild peripancreatic stranding. Recommend correlation with serum lipase to assess acute pancreatitis.  Indeterminate bilateral adnexal lesions. Borderline endometrium. Endometrial/ovarian neoplasm cannot be excluded. Recommend follow-up.  Bladder wall thickening and mucosal enhancement. Recommend correlation with urinalysis to assess cystitis.  Elevated right hemidiaphragm with right lower lobe atelectasis. Nonspecific patchy opacity in the left lower lobe. Recommend clinical correlation to assess pneumonia and follow-up to assess resolution.    In the ED, given morphine and cefepime x 1. (02 Sep 2017 04:24)    Indication for Palliative Care Unit Services:    ADVANCE DIRECTIVES:    DNR [x ] YES [ ] NO                            [ ] Completed  MOLST  [ ] YES [x ] NO                      [ ] Completed  Health Care Proxy [ ] YES  [x ] NO   [ ] Completed  Living Will  [ ] YES [x ] NO             [x ] Surrogate  [ ] HCP  [ ] Guardian:             benjamin ortiz son                          Phone  Allergies    penicillin (Unknown)    Intolerances        MEDICATIONS  (STANDING):  sodium chloride 0.9%. 1000 milliLiter(s) (10 mL/Hr) IV Continuous <Continuous>  nystatin Powder 1 Application(s) Topical two times a day  HYDROmorphone Infusion 1 mG/Hr (1 mL/Hr) IV Continuous <Continuous>    MEDICATIONS  (PRN):  acetaminophen  Suppository 650 milliGRAM(s) Rectal every 6 hours PRN For Temp greater than 38 C (100.4 F)  glycopyrrolate Injectable 0.4 milliGRAM(s) IV Push every 6 hours PRN Secretions  bisacodyl Suppository 10 milliGRAM(s) Rectal daily PRN Constipation  LORazepam   Injectable 0.5 milliGRAM(s) IV Push every 6 hours PRN Anxiety  HYDROmorphone  Injectable 2 milliGRAM(s) IV Push every 1 hour PRN Pain  HYDROmorphone  Injectable 2 milliGRAM(s) IV Push every 1 hour PRN Dyspnea      PRESENT SYMPTOMS:  Source: [ ] Patient   [x ] Family   [x ] Team     Pain:                        [ ] No [ ] Yes             [ ] Mild [ ] Moderate [ ] Severe    Onset -  Location -  Duration -  Character -  Alleviating/Aggravating -  Radiation -  Timing -      Dyspnea:                [x ] No [ ] Yes             [ ] Mild [ ] Moderate [ ] Severe    Anxiety:                  [x ] No [ ] Yes             [ ] Mild [ ] Moderate [ ] Severe    Fatigue:                  [ ] No [x ] Yes             [ ] Mild [x ] Moderate [ ] Severe    Nausea:                  [x ] No [ ] Yes             [ ] Mild [ ] Moderate [ ] Severe    Loss of appetite:   [ ] No [ x] Yes             [ ] Mild [ ] Moderate [ ] Severe    Constipation:        [x ] No [ ] Yes             [ ] Mild [ ] Moderate [ ] Severe    Other Symptoms:  [ ] All other review of systems negative   [x ] Unable to obtain due to poor mentation     Karnofsky Performance Score/Palliative Performance Status Version 2:      10   %    PHYSICAL EXAM:  Vital Signs Last 24 Hrs  T(C): 36.6 (12 Sep 2017 07:46), Max: 36.6 (12 Sep 2017 07:46)  T(F): 97.8 (12 Sep 2017 07:46), Max: 97.8 (12 Sep 2017 07:46)  HR: 93 (12 Sep 2017 07:46) (93 - 93)  BP: 130/78 (12 Sep 2017 07:46) (130/78 - 130/78)  BP(mean): --  RR: 16 (12 Sep 2017 07:46) (16 - 16)  SpO2: 96% (12 Sep 2017 07:46) (96% - 96%) I&O's Summary    11 Sep 2017 07:01  -  12 Sep 2017 07:00  --------------------------------------------------------  IN: 132 mL / OUT: 0 mL / NET: 132 mL        General:  [ ] Alert  [ ] Oriented x      [x ] Lethargic  [ ] Agitated   [ ] Cachexia   [x ] Unarousable  [ ] Verbal  [ ] Non-Verbal    HEENT:  [ ] Normal   [x ] Dry mouth   [ ] ET Tube    [ ] Trach  [ ] Oral lesions    Lungs:   [ ] Clear [ ] Tachypnea  [ ] Audible excessive secretions   [x ] Rhonchi        [ ] Right [ ] Left [ ] Bilateral  [ ] Crackles        [ ] Right [ ] Left [ ] Bilateral  [ ] Wheezing     [ ] Right [ ] Left [ ] Bilateral  [ ] Respiratory failure [ ] Acute [ ] Chronic [ ] Hypoxemic [ ] Hypercarbic [ ] Other    Cardiovascular:   [ ] Regular [ ] Irregular [x ] Tachycardia   [ ] Bradycardia  [ ] Murmur [ ] Other  [ ] Shock [ ] Septic [ ] Hypovolemic [ ] Neurogenic [ ] Cardiogenic   [ ] Vasopressors [ ] Inotrophs    Abdomen:   [x ] Soft  [ x] Distended   [x ] +BS  [ x] Non tender [ ] Tender  [ ]PEG   [ ]OGT/ NGT   Last BM:     [ ] Other    Genitourinary:  [ ] Normal [ ] Incontinent   [ ] Oliguria/Anuria   [x ] Jimenes  [ ] Other       Musculoskeletal:    [ ] Normal   [x ] Weakness  [ ] Edema  [ x] Bedbound/Wheelchair bound [ ]  Ambulatory [ ] with [ ] without assistance [x ] Functional quadriplegia    Neurological: [ ] No focal deficits  [x ] Cognitive impairment  [ ] Dysphagia [ ] Dysarthria [ ] Paresis [ ] Other   [ ] Brain compression  [ ] Cerebral edema [ x] Encephalopathy    Skin: [ ] Normal   [ ] Ulcer(s) type [ ] Diabetic [ ] Pressure [x ] Other jaundice   Stage        POA [ ]  Yes [ ]  No       [ ] Rash    LABS:                Protein Calorie Malnutrition: [ ] Mild [x ] Moderate [ ] Severe    Oral Intake: [x ] Unable/mouth care only [ ] Minimal [ ] Moderate [ ] Full Capability  Diet: [ ] NPO [ ] Tube feeds [ ] TPN [ ] Other     RADIOLOGY & ADDITIONAL STUDIES:    REFERRALS:   [ ] Chaplaincy  [ ] Hospice Care  [ ] Child Life  [x ] Social Work  [ ] Case management [ ] Nutrition [ ] Holistic Therapy [ ] Wound Care [ ]Physical Therapy [ ] Other [ ]See goals of care note in [place]

## 2017-09-13 NOTE — PROGRESS NOTE ADULT - SUBJECTIVE AND OBJECTIVE BOX
INTERVAL HPI/OVERNIGHT EVENTS: Pt lying in bed in mild distress, noted to be in pain when repositioned and screaming unable to do ROS due to considtion    Allergies    penicillin (Unknown)    Intolerances        ADVANCE DIRECTIVES:    DNR: [ ] NO [x ] YES (Date) MOLST [ ] NO [ ] YES (Date)    MEDICATIONS  (STANDING):  sodium chloride 0.9%. 1000 milliLiter(s) (10 mL/Hr) IV Continuous <Continuous>  nystatin Powder 1 Application(s) Topical two times a day  HYDROmorphone Infusion 2 mG/Hr (2 mL/Hr) IV Continuous <Continuous>    MEDICATIONS  (PRN):  acetaminophen  Suppository 650 milliGRAM(s) Rectal every 6 hours PRN For Temp greater than 38 C (100.4 F)  glycopyrrolate Injectable 0.4 milliGRAM(s) IV Push every 6 hours PRN Secretions  bisacodyl Suppository 10 milliGRAM(s) Rectal daily PRN Constipation  LORazepam   Injectable 0.5 milliGRAM(s) IV Push every 1 hour PRN Anxiety  HYDROmorphone  Injectable 4 milliGRAM(s) IV Push every 1 hour PRN Pain  HYDROmorphone  Injectable 4 milliGRAM(s) IV Push every 1 hour PRN Dyspnea      PRESENT SYMPTOMS:  Source: [ ] Patient   [ ] Family   [x ] Team     Pain:                        [ ] No [ x] Yes             [ ] Mild [ ] Moderate [ ] Severe    Onset - unable  Location -unable   Duration -unable  Character -unable  Alleviating/Aggravating - unable  Radiation - unable  Timing - unable     Dyspnea:                [x ] No [ ] Yes             [ ] Mild [ ] Moderate [ ] Severe    Anxiety:                  [ ] No [ x] Yes             [ ] Mild [x ] Moderate [ ] Severe    Fatigue:                  [ ] No [ x] Yes             [ ] Mild [ ] Moderate [x ] Severe    Nausea:                  [ x] No [ ] Yes             [ ] Mild [ ] Moderate [ ] Severe    Loss of appetite:   [ ] No [ x] Yes             [ ] Mild [ ] Moderate [ x] Severe    Constipation:        [x ] No [ ] Yes             [ ] Mild [ ] Moderate [ ] Severe    Other Symptoms:  [x ] All other review of systems negative   [ ] Unable to obtain due to poor mentation     Karnofsky Performance Score/Palliative Performance Status Version 2:     20    %    PHYSICAL EXAM:  Vital Signs Last 24 Hrs  T(C): 36.3 (13 Sep 2017 06:33), Max: 36.3 (13 Sep 2017 06:33)  T(F): 97.4 (13 Sep 2017 06:33), Max: 97.4 (13 Sep 2017 06:33)  HR: 93 (13 Sep 2017 06:33) (93 - 93)  BP: 126/75 (13 Sep 2017 06:33) (126/75 - 126/75)  BP(mean): --  RR: 12 (13 Sep 2017 06:33) (12 - 12)  SpO2: 95% (13 Sep 2017 06:33) (95% - 95%) I&O's Summary      General:  [ ] Alert  [ ] Oriented x      [ ] Lethargic  [ ] Agitated   [x ] Cachexia   [ ] Unarousable  [ x] Verbal  [ ] Non-Verbal    HEENT:  [ ] Normal   [x ] Dry mouth   [ ] ET Tube    [ ] Trach  [ ] Oral lesions    Lungs:   [x ] Clear [ ] Tachypnea  [ ] Audible excessive secretions   [ ] Rhonchi        [ ] Right [ ] Left [ ] Bilateral  [ ] Crackles        [ ] Right [ ] Left [ ] Bilateral  [ ] Wheezing     [ ] Right [ ] Left [ ] Bilateral    Cardiovascular:  [ x] Regular [ ] Irregular [ ] Tachycardia   [ ] Bradycardia  [ ] Murmur [ ] Other    Abdomen: [x ] Soft  [ ] Distended   [ ] +BS  [ x] Non tender [ ] Tender  [ ]PEG   [ ]OGT/ NGT   Last BM:       Genitourinary: [ ] Normal [ ] Incontinent   [ ] Oliguria/Anuria   [x ] Jimenes    Musculoskeletal:  [ ] Normal   [ ] Weakness  [x ] Bedbound/Wheelchair bound [ ] Edema    Neurological: [ ] No focal deficits  [ ] Cognitive impairment  [ ] Dysphagia [ ] Dysarthria [ ] Paresis [x ] Other coma    Skin: [ ] Normal   [ ] Pressure ulcer(s)                  [ ] Rash + jaundice    LABS:      none          Shock: [ ] Septic [ ] Cardiogenic [ ] Neurologic [ ] Hypovolemic  Vasopressors x   Inotrophs x     Oral Intake: [ ] Unable/mouth care only [ ] Minimal [ ] Moderate [ ] Full Capability    Diet: [ ] NPO [ ] Tube feeds [ ] TPN [ ] Other     RADIOLOGY & ADDITIONAL STUDIES:  none  REFERRALS:   [ x] Chaplaincy  [ ] Hospice  [ ] Child Life  [ ] Social Work  [ ] Case management [ ] Holistic Therapy

## 2017-09-13 NOTE — PROGRESS NOTE ADULT - PROBLEM SELECTOR PLAN 3
uncontrolled; used 4 BTD in 24H  inc dilaudid drip to 2mg /hr  - inc dilaudid PRN to 4mg IVp Q1H PRN

## 2017-09-15 NOTE — PROGRESS NOTE ADULT - SUBJECTIVE AND OBJECTIVE BOX
Geriatric and Palliative Care Unit Progress Note [ ] Hospice Progress Note [ ]     HPI:  83F PMH COPD (on home 2L 02), bipolar disorder, htn, gerd, hypothyroidism presents from nursing home w/ worsening jaundice over past two weeks.  Also with light light yellow BMs.  Has had worsening epigastric pain as well as worsening rib and back pain .  Denies fevers, chills, chest pain, nausea, vomiting, cough.  She had labwork done at nursing home 9/1/17 which showed bilirubin 12.2 (9.9 direct), w/ , , alk phos 546 so was sent to [facility] for evaluation.  Reports decreased PO intake the past two days 2/2 no appetite. Denies weight loss.  CT A/P w/ common bile duct and moderate intrahepatic biliary dilatation, Pancreatic ductal dilatation, may be in part due to distal common bile duct stone although underlying duodenal/ampullary neoplasm cannot be excluded given soft tissue structure in the second portion of the duodenum. Pancreatic ductal dilatation and multiple indeterminate pancreatic lesions. Patient was admitted for obstructive jaundice. Transferred to PCU for symptom management.    Indication for Palliative Care Unit Services: Symptom management.    ADVANCE DIRECTIVES:    DNR Yes    MOLST  [ ] YES [x ] NO                      [ ] Completed  Health Care Proxy [ ] YES  [x ] NO   [ ] Completed  Living Will  [ ] YES [x ] NO             [ ] Surrogate  [ ] HCP  [ ] Guardian: Jason Muniz (son)  Phone#:    Allergies    penicillin (Unknown)    Intolerances    MEDICATIONS  (STANDING):  sodium chloride 0.9%. 1000 milliLiter(s) (10 mL/Hr) IV Continuous <Continuous>  nystatin Powder 1 Application(s) Topical two times a day  HYDROmorphone Infusion 2 mG/Hr (2 mL/Hr) IV Continuous <Continuous>    MEDICATIONS  (PRN):  acetaminophen  Suppository 650 milliGRAM(s) Rectal every 6 hours PRN For Temp greater than 38 C (100.4 F)  glycopyrrolate Injectable 0.4 milliGRAM(s) IV Push every 6 hours PRN Secretions  bisacodyl Suppository 10 milliGRAM(s) Rectal daily PRN Constipation  LORazepam   Injectable 0.5 milliGRAM(s) IV Push every 1 hour PRN Anxiety  HYDROmorphone  Injectable 4 milliGRAM(s) IV Push every 1 hour PRN Pain  HYDROmorphone  Injectable 4 milliGRAM(s) IV Push every 1 hour PRN Dyspnea    PRESENT SYMPTOMS:  Source: [x ] Patient   [ ] Family   [x ] Team     Pain:                        [x ] No [ ] Yes             [ ] Mild [ ] Moderate [ ] Severe    Dyspnea:                [ ] No [x ] Yes             [ ] Mild [x ] Moderate [ ] Severe    Anxiety:                  [ ] No [x ] Yes             [x ] Mild [ ] Moderate [ ] Severe    Fatigue:                  [x ] No [ ] Yes             [ ] Mild [ ] Moderate [ ] Severe    Nausea:                  [x ] No [ ] Yes             [ ] Mild [ ] Moderate [ ] Severe    Loss of appetite:   [x ] No [ ] Yes             [ ] Mild [ ] Moderate [ ] Severe    Constipation:        [x ] No [ ] Yes             [ ] Mild [ ] Moderate [ ] Severe    Other Symptoms:  [ ] All other review of systems negative   [x ] Unable to obtain due to poor mentation     Karnofsky Performance Score/Palliative Performance Status Version 2: 10  %    PHYSICAL EXAM:  Vital Signs Last 24 Hrs  T(C): --  T(F): --  HR: --  BP: --  BP(mean): --  RR: --  SpO2: -- I&O's Summary    10 Sep 2017 07:01  -  11 Sep 2017 07:00  --------------------------------------------------------  IN: 100 mL / OUT: 0 mL / NET: 100 mL    General:  [ ] Alert  [ ] Oriented x      [x ] Lethargic  [ ] Agitated   [ ] Cachexia   [ ] Unarousable  [ ] Verbal  [ ] Non-Verbal    HEENT:  [ ] Normal   [x ] Dry mouth   [ ] ET Tube    [ ] Trach  [ ] Oral lesions    Lungs:   [x ] Clear [ ] Tachypnea  [ ] Audible excessive secretions   [ ] Rhonchi        [ ] Right [ ] Left [ ] Bilateral  [ ] Crackles        [ ] Right [ ] Left [ ] Bilateral  [ ] Wheezing     [ ] Right [ ] Left [ ] Bilateral  [ ] Respiratory failure [ ] Acute [ ] Chronic [ ] Hypoxemic [ ] Hypercarbic [ ] Other    Cardiovascular:   [x ] Regular [ ] Irregular [ ] Tachycardia   [ ] Bradycardia  [ ] Murmur [ ] Other  [ ] Shock [ ] Septic [ ] Hypovolemic [ ] Neurogenic [ ] Cardiogenic   [ ] Vasopressors [ ] Inotrophs    Abdomen:   [ ] Soft  [x ] Distended   [ ] +BS  [ ] Non tender [x ] Tender  [ ]PEG   [ ]OGT/ NGT   Last BM:     [ ] Other    Genitourinary:  [ ] Normal [ ] Incontinent   [ ] Oliguria/Anuria   [x ] Jimenes  [ ] Other       Musculoskeletal:    [ ] Normal   [x ] Weakness  [ ] Edema  [ ] Bedbound/Wheelchair bound [ ]  Ambulatory [ ] with [ ] without assistance [ ] Functional quadriplegia    Neurological: [ ] No focal deficits  [ ] Cognitive impairment  [ ] Dysphagia [ ] Dysarthria [ ] Paresis [ ] Other   [ ] Brain compression  [ ] Cerebral edema [x ] Encephalopathy    Skin: [ ] Normal   [ ] Ulcer(s) type [ ] Diabetic [ ] Pressure [ ] Other   Stage        POA [ ]  Yes [ ]  No       [ ] Rash    LABS: reviewed.    Protein Calorie Malnutrition: [ ] Mild [ ] Moderate [x ] Severe    Oral Intake: [ ] Unable/mouth care only [ ] Minimal [ ] Moderate [ ] Full Capability  Diet: [ ] NPO [ ] Tube feeds [ ] TPN [ ] Other     RADIOLOGY & ADDITIONAL STUDIES: reviewed    REFERRALS:   [ ] Chaplaincy  [ ] Hospice Care  [ ] Child Life  [ ] Social Work  [ ] Case management [ ] Nutrition [ ] Holistic Therapy [ ] Wound Care [ ]Physical Therapy [ ] Other [ ]See goals of care note in [place]

## 2017-09-16 NOTE — PROGRESS NOTE ADULT - SUBJECTIVE AND OBJECTIVE BOX
Geriatric and Palliative Care Unit Progress Note [ ] Hospice Progress Note [ ]     HPI:  83F PMH COPD (on home 2L 02), bipolar disorder, htn, gerd, hypothyroidism presents from nursing home w/ worsening jaundice over past two weeks.  Also with light light yellow BMs.  Has had worsening epigastric pain as well as worsening rib and back pain .  Denies fevers, chills, chest pain, nausea, vomiting, cough.  She had labwork done at nursing home 9/1/17 which showed bilirubin 12.2 (9.9 direct), w/ , , alk phos 546 so was sent to [facility] for evaluation.  Reports decreased PO intake the past two days 2/2 no appetite. Denies weight loss.  CT A/P w/ common bile duct and moderate intrahepatic biliary dilatation, Pancreatic ductal dilatation, may be in part due to distal common bile duct stone although underlying duodenal/ampullary neoplasm cannot be excluded given soft tissue structure in the second portion of the duodenum. Pancreatic ductal dilatation and multiple indeterminate pancreatic lesions. Patient was admitted for obstructive jaundice. Transferred to PCU for symptom management.    Indication for Palliative Care Unit Services: Symptom management.    ADVANCE DIRECTIVES:    DNR Yes    MOLST  [ ] YES [x ] NO                      [ ] Completed  Health Care Proxy [ ] YES  [x ] NO   [ ] Completed  Living Will  [ ] YES [x ] NO             [ ] Surrogate  [ ] HCP  [ ] Guardian: Jason Muniz (son)  Phone#:    Allergies    penicillin (Unknown)    Intolerances    MEDICATIONS  (STANDING):  sodium chloride 0.9%. 1000 milliLiter(s) (10 mL/Hr) IV Continuous <Continuous>  nystatin Powder 1 Application(s) Topical two times a day  HYDROmorphone Infusion 2 mG/Hr (2 mL/Hr) IV Continuous <Continuous>    MEDICATIONS  (PRN):  acetaminophen  Suppository 650 milliGRAM(s) Rectal every 6 hours PRN For Temp greater than 38 C (100.4 F)  glycopyrrolate Injectable 0.4 milliGRAM(s) IV Push every 6 hours PRN Secretions  bisacodyl Suppository 10 milliGRAM(s) Rectal daily PRN Constipation  LORazepam   Injectable 0.5 milliGRAM(s) IV Push every 1 hour PRN Anxiety  HYDROmorphone  Injectable 4 milliGRAM(s) IV Push every 1 hour PRN Pain  HYDROmorphone  Injectable 4 milliGRAM(s) IV Push every 1 hour PRN Dyspnea      PRESENT SYMPTOMS:  Source: [x ] Patient   [ ] Family   [x ] Team     Pain:                        [x ] No [ ] Yes             [ ] Mild [ ] Moderate [ ] Severe    Dyspnea:                [ ] No [x ] Yes             [ ] Mild [x ] Moderate [ ] Severe    Anxiety:                  [ ] No [x ] Yes             [x ] Mild [ ] Moderate [ ] Severe    Fatigue:                  [x ] No [ ] Yes             [ ] Mild [ ] Moderate [ ] Severe    Nausea:                  [x ] No [ ] Yes             [ ] Mild [ ] Moderate [ ] Severe    Loss of appetite:   [x ] No [ ] Yes             [ ] Mild [ ] Moderate [ ] Severe    Constipation:        [x ] No [ ] Yes             [ ] Mild [ ] Moderate [ ] Severe    Other Symptoms:  [ ] All other review of systems negative   [x ] Unable to obtain due to poor mentation     Karnofsky Performance Score/Palliative Performance Status Version 2: 10  %    PHYSICAL EXAM:  Vital Signs Last 24 Hrs  T(C): 36.8 (15 Sep 2017 08:26), Max: 36.8 (15 Sep 2017 08:26)  T(F): 98.2 (15 Sep 2017 08:26), Max: 98.2 (15 Sep 2017 08:26)  HR: 93 (15 Sep 2017 08:26) (93 - 93)  BP: 115/71 (15 Sep 2017 08:26) (115/71 - 115/71)  BP(mean): --  RR: 16 (15 Sep 2017 08:26) (16 - 16)  SpO2: 89% (15 Sep 2017 08:26) (89% - 89%)    General:  [ ] Alert  [ ] Oriented x      [x ] Lethargic  [ ] Agitated   [ ] Cachexia   [ ] Unarousable  [ ] Verbal  [ ] Non-Verbal    HEENT:  [ ] Normal   [x ] Dry mouth   [ ] ET Tube    [ ] Trach  [ ] Oral lesions    Lungs:   [x ] Clear [ ] Tachypnea  [ ] Audible excessive secretions   [ ] Rhonchi        [ ] Right [ ] Left [ ] Bilateral  [ ] Crackles        [ ] Right [ ] Left [ ] Bilateral  [ ] Wheezing     [ ] Right [ ] Left [ ] Bilateral  [ ] Respiratory failure [ ] Acute [ ] Chronic [ ] Hypoxemic [ ] Hypercarbic [ ] Other    Cardiovascular:   [x ] Regular [ ] Irregular [ ] Tachycardia   [ ] Bradycardia  [ ] Murmur [ ] Other  [ ] Shock [ ] Septic [ ] Hypovolemic [ ] Neurogenic [ ] Cardiogenic   [ ] Vasopressors [ ] Inotrophs    Abdomen:   [ ] Soft  [x ] Distended   [ ] +BS  [ ] Non tender [x ] Tender  [ ]PEG   [ ]OGT/ NGT   Last BM:     [ ] Other    Genitourinary:  [ ] Normal [ ] Incontinent   [ ] Oliguria/Anuria   [x ] Jimenes  [ ] Other       Musculoskeletal:    [ ] Normal   [x ] Weakness  [ ] Edema  [ ] Bedbound/Wheelchair bound [ ]  Ambulatory [ ] with [ ] without assistance [ ] Functional quadriplegia    Neurological: [ ] No focal deficits  [ ] Cognitive impairment  [ ] Dysphagia [ ] Dysarthria [ ] Paresis [ ] Other   [ ] Brain compression  [ ] Cerebral edema [x ] Encephalopathy    Skin: [ ] Normal   [ ] Ulcer(s) type [ ] Diabetic [ ] Pressure [ ] Other   Stage        POA [ ]  Yes [ ]  No       [ ] Rash    LABS: reviewed.    Protein Calorie Malnutrition: [ ] Mild [ ] Moderate [x ] Severe    Oral Intake: [ ] Unable/mouth care only [ ] Minimal [ ] Moderate [ ] Full Capability  Diet: [ ] NPO [ ] Tube feeds [ ] TPN [ ] Other     RADIOLOGY & ADDITIONAL STUDIES: reviewed    REFERRALS:   [ ] Chaplaincy  [ ] Hospice Care  [ ] Child Life  [ ] Social Work  [ ] Case management [ ] Nutrition [ ] Holistic Therapy [ ] Wound Care [ ]Physical Therapy [ ] Other [ ]See goals of care note in [place] Geriatric and Palliative Care Unit Progress Note [ ] Hospice Progress Note [ ]     HPI:  83F PMH COPD (on home 2L 02), bipolar disorder, htn, gerd, hypothyroidism presents from nursing home w/ worsening jaundice over past two weeks.  Also with light light yellow BMs.  Has had worsening epigastric pain as well as worsening rib and back pain .  Denies fevers, chills, chest pain, nausea, vomiting, cough.  She had labwork done at nursing home 9/1/17 which showed bilirubin 12.2 (9.9 direct), w/ , , alk phos 546 so was sent to [facility] for evaluation.  Reports decreased PO intake the past two days 2/2 no appetite. Denies weight loss.  CT A/P w/ common bile duct and moderate intrahepatic biliary dilatation, Pancreatic ductal dilatation, may be in part due to distal common bile duct stone although underlying duodenal/ampullary neoplasm cannot be excluded given soft tissue structure in the second portion of the duodenum. Pancreatic ductal dilatation and multiple indeterminate pancreatic lesions. Patient was admitted for obstructive jaundice. Transferred to PCU for symptom management.    Indication for Palliative Care Unit Services: Symptom management.    ADVANCE DIRECTIVES:    DNR Yes    MOLST  [ ] YES [x ] NO                      [ ] Completed  Health Care Proxy [ ] YES  [x ] NO   [ ] Completed  Living Will  [ ] YES [x ] NO             [ ] Surrogate  [ ] HCP  [ ] Guardian: Jason Muniz (son)  Phone#:    Allergies    penicillin (Unknown)    Intolerances    MEDICATIONS  (STANDING):  sodium chloride 0.9%. 1000 milliLiter(s) (10 mL/Hr) IV Continuous <Continuous>  nystatin Powder 1 Application(s) Topical two times a day  HYDROmorphone Infusion 2 mG/Hr (2 mL/Hr) IV Continuous <Continuous>    MEDICATIONS  (PRN):  acetaminophen  Suppository 650 milliGRAM(s) Rectal every 6 hours PRN For Temp greater than 38 C (100.4 F)  glycopyrrolate Injectable 0.4 milliGRAM(s) IV Push every 6 hours PRN Secretions  bisacodyl Suppository 10 milliGRAM(s) Rectal daily PRN Constipation  LORazepam   Injectable 0.5 milliGRAM(s) IV Push every 1 hour PRN Anxiety  HYDROmorphone  Injectable 4 milliGRAM(s) IV Push every 1 hour PRN Pain  HYDROmorphone  Injectable 4 milliGRAM(s) IV Push every 1 hour PRN Dyspnea      PRESENT SYMPTOMS:  Source: [ ] Patient   [ ] Family   [x ] Team     Pain:                        [x ] No [ ] Yes             [ ] Mild [ ] Moderate [ ] Severe    Dyspnea:                [x ] No [ ] Yes             [ ] Mild [ ] Moderate [ ] Severe    Anxiety:                  [ x] No [ ] Yes             [ ] Mild [ ] Moderate [ ] Severe    Fatigue:                  [x ] No [x ] Yes             [ ] Mild [ ] Moderate [x ] Severe    Nausea:                  [ ] No [ ] Yes             [ ] Mild [ ] Moderate [ ] Severe    Loss of appetite:   [ ] No [ ] Yes             [ ] Mild [ ] Moderate [ ] Severe    Constipation:        [x ] No [ ] Yes             [ ] Mild [ ] Moderate [ ] Severe    Other Symptoms:  [ ] All other review of systems negative   [x ] Unable to obtain due to poor mentation     Karnofsky Performance Score/Palliative Performance Status Version 2: 10  %    PHYSICAL EXAM:  Vital Signs Last 24 Hrs  T(C): 36.8 (15 Sep 2017 08:26), Max: 36.8 (15 Sep 2017 08:26)  T(F): 98.2 (15 Sep 2017 08:26), Max: 98.2 (15 Sep 2017 08:26)  HR: 93 (15 Sep 2017 08:26) (93 - 93)  BP: 115/71 (15 Sep 2017 08:26) (115/71 - 115/71)  BP(mean): --  RR: 16 (15 Sep 2017 08:26) (16 - 16)  SpO2: 89% (15 Sep 2017 08:26) (89% - 89%)    General:  [ ] Alert  [ ] Oriented x      [x ] Lethargic  [ ] Agitated   [ ] Cachexia   [ ] Unarousable  [ ] Verbal  [ ] Non-Verbal    HEENT:  [ ] Normal   [x ] Dry mouth   [ ] ET Tube    [ ] Trach  [ ] Oral lesions    Lungs:   [x ] Clear [ ] Tachypnea  [ ] Audible excessive secretions   [ ] Rhonchi        [ ] Right [ ] Left [ ] Bilateral  [ ] Crackles        [ ] Right [ ] Left [ ] Bilateral  [ ] Wheezing     [ ] Right [ ] Left [ ] Bilateral  [ ] Respiratory failure [ ] Acute [ ] Chronic [ ] Hypoxemic [ ] Hypercarbic [ ] Other    Cardiovascular:   [x ] Regular [ ] Irregular [ ] Tachycardia   [ ] Bradycardia  [ ] Murmur [ ] Other  [ ] Shock [ ] Septic [ ] Hypovolemic [ ] Neurogenic [ ] Cardiogenic   [ ] Vasopressors [ ] Inotrophs    Abdomen:   [ ] Soft  [x ] Distended   [ ] +BS  [ ] Non tender [x ] Tender  [ ]PEG   [ ]OGT/ NGT   Last BM:     [ ] Other    Genitourinary:  [ ] Normal [ ] Incontinent   [ ] Oliguria/Anuria   [x ] Jimenes  [ ] Other       Musculoskeletal:    [ ] Normal   [x ] Weakness  [ ] Edema  [ ] Bedbound/Wheelchair bound [ ]  Ambulatory [ ] with [ ] without assistance [ ] Functional quadriplegia    Neurological: [ ] No focal deficits  [ ] Cognitive impairment  [ ] Dysphagia [ ] Dysarthria [ ] Paresis [ ] Other   [ ] Brain compression  [ ] Cerebral edema [x ] Encephalopathy    Skin: [ ] Normal   [ ] Ulcer(s) type [ ] Diabetic [ ] Pressure [ x] Other jaundiced  Stage        POA [ ]  Yes [ ]  No       [ ] Rash    LABS: reviewed.    Protein Calorie Malnutrition: [ ] Mild [ ] Moderate [x ] Severe    Oral Intake: [ ] Unable/mouth care only [ ] Minimal [ ] Moderate [ ] Full Capability  Diet: [ ] NPO [ ] Tube feeds [ ] TPN [ ] Other     RADIOLOGY & ADDITIONAL STUDIES: reviewed    REFERRALS:   [ ] Chaplaincy  [ ] Hospice Care  [ ] Child Life  [ ] Social Work  [ ] Case management [ ] Nutrition [ ] Holistic Therapy [ ] Wound Care [ ]Physical Therapy [ ] Other [ ]See goals of care note in [place]

## 2017-09-16 NOTE — PROGRESS NOTE ADULT - ATTENDING COMMENTS
Pt seen with fellow.  Agree with above.  continue dilaudid gtt at 2mg/hr with prn.  Goals are for comfort. emotional support provided to family at bedside.

## 2017-09-16 NOTE — PROGRESS NOTE ADULT - PROBLEM SELECTOR PLAN 6
Symptoms well controlled. Patient in active dying process. DNR/DNI. One dilaudid prn used for dyspnea/24 hours. Symptoms well controlled. Patient in active dying process. DNR/DNI. One dilaudid prn used for dyspnea/24 hours. Discussed current care with family.

## 2017-09-17 NOTE — PROGRESS NOTE ADULT - ATTENDING COMMENTS
Pt seen with fellow.  Agree with above.  Dilaudid gtt increased overnight for increasing symptoms.  Pt now comfortable.

## 2017-09-17 NOTE — PROGRESS NOTE ADULT - PROBLEM SELECTOR PLAN 6
Symptoms well controlled. Patient in active dying process. DNR/DNI. Rilaudid prn used for as above/24 hours. Discussed current care with family. Symptoms well controlled. Patient in active dying process. DNR/DNI. Dilaudid prn used for as above/24 hours. Discussed current care with family.

## 2017-09-17 NOTE — PROGRESS NOTE ADULT - SUBJECTIVE AND OBJECTIVE BOX
Geriatric and Palliative Care Unit Progress Note [ ] Hospice Progress Note [ ]     HPI:  83F PMH COPD (on home 2L 02), bipolar disorder, htn, gerd, hypothyroidism presents from nursing home w/ worsening jaundice over past two weeks.  Also with light light yellow BMs.  Has had worsening epigastric pain as well as worsening rib and back pain .  Denies fevers, chills, chest pain, nausea, vomiting, cough.  She had labwork done at nursing home 9/1/17 which showed bilirubin 12.2 (9.9 direct), w/ , , alk phos 546 so was sent to [facility] for evaluation.  Reports decreased PO intake the past two days 2/2 no appetite. Denies weight loss.  CT A/P w/ common bile duct and moderate intrahepatic biliary dilatation, Pancreatic ductal dilatation, may be in part due to distal common bile duct stone although underlying duodenal/ampullary neoplasm cannot be excluded given soft tissue structure in the second portion of the duodenum. Pancreatic ductal dilatation and multiple indeterminate pancreatic lesions. Patient was admitted for obstructive jaundice. Transferred to PCU for symptom management.    Indication for Palliative Care Unit Services: Symptom management.    ADVANCE DIRECTIVES:    DNR Yes    MOLST  [ ] YES [x ] NO                      [ ] Completed  Health Care Proxy [ ] YES  [x ] NO   [ ] Completed  Living Will  [ ] YES [x ] NO             [ ] Surrogate  [ ] HCP  [ ] Guardian: Jason Muniz (son)  Phone#:    Allergies    penicillin (Unknown)    Intolerances    MEDICATIONS  (STANDING):  sodium chloride 0.9%. 1000 milliLiter(s) (10 mL/Hr) IV Continuous <Continuous>  nystatin Powder 1 Application(s) Topical two times a day  HYDROmorphone Infusion 4 mG/Hr (4 mL/Hr) IV Continuous <Continuous>    MEDICATIONS  (PRN):  acetaminophen  Suppository 650 milliGRAM(s) Rectal every 6 hours PRN For Temp greater than 38 C (100.4 F)  glycopyrrolate Injectable 0.4 milliGRAM(s) IV Push every 6 hours PRN Secretions  bisacodyl Suppository 10 milliGRAM(s) Rectal daily PRN Constipation  LORazepam   Injectable 0.5 milliGRAM(s) IV Push every 1 hour PRN Anxiety  HYDROmorphone  Injectable 8 milliGRAM(s) IV Push every 1 hour PRN Pain  HYDROmorphone  Injectable 8 milliGRAM(s) IV Push every 1 hour PRN Dyspnea      PRESENT SYMPTOMS:  Source: [ ] Patient   [ ] Family   [x ] Team     Pain:                        [x ] No [ ] Yes             [ ] Mild [ ] Moderate [ ] Severe    Dyspnea:                [x ] No [ ] Yes             [ ] Mild [ ] Moderate [ ] Severe    Anxiety:                  [ x] No [ ] Yes             [ ] Mild [ ] Moderate [ ] Severe    Fatigue:                  [x ] No [x ] Yes             [ ] Mild [ ] Moderate [x ] Severe    Nausea:                  [ ] No [ ] Yes             [ ] Mild [ ] Moderate [ ] Severe    Loss of appetite:   [ ] No [ ] Yes             [ ] Mild [ ] Moderate [ ] Severe    Constipation:        [x ] No [ ] Yes             [ ] Mild [ ] Moderate [ ] Severe    Other Symptoms:  [ ] All other review of systems negative   [x ] Unable to obtain due to poor mentation     Karnofsky Performance Score/Palliative Performance Status Version 2: 10  %    PHYSICAL EXAM:  PENDING    [ ] Alert  [ ] Oriented x      [x ] Lethargic  [ ] Agitated   [ ] Cachexia   [ ] Unarousable  [ ] Verbal  [ ] Non-Verbal    HEENT:  [ ] Normal   [x ] Dry mouth   [ ] ET Tube    [ ] Trach  [ ] Oral lesions    Lungs:   [x ] Clear [ ] Tachypnea  [ ] Audible excessive secretions   [ ] Rhonchi        [ ] Right [ ] Left [ ] Bilateral  [ ] Crackles        [ ] Right [ ] Left [ ] Bilateral  [ ] Wheezing     [ ] Right [ ] Left [ ] Bilateral  [ ] Respiratory failure [ ] Acute [ ] Chronic [ ] Hypoxemic [ ] Hypercarbic [ ] Other    Cardiovascular:   [x ] Regular [ ] Irregular [ ] Tachycardia   [ ] Bradycardia  [ ] Murmur [ ] Other  [ ] Shock [ ] Septic [ ] Hypovolemic [ ] Neurogenic [ ] Cardiogenic   [ ] Vasopressors [ ] Inotrophs    Abdomen:   [ ] Soft  [x ] Distended   [ ] +BS  [ ] Non tender [x ] Tender  [ ]PEG   [ ]OGT/ NGT   Last BM:     [ ] Other    Genitourinary:  [ ] Normal [ ] Incontinent   [ ] Oliguria/Anuria   [x ] Jimenes  [ ] Other       Musculoskeletal:    [ ] Normal   [x ] Weakness  [ ] Edema  [ ] Bedbound/Wheelchair bound [ ]  Ambulatory [ ] with [ ] without assistance [ ] Functional quadriplegia    Neurological: [ ] No focal deficits  [ ] Cognitive impairment  [ ] Dysphagia [ ] Dysarthria [ ] Paresis [ ] Other   [ ] Brain compression  [ ] Cerebral edema [x ] Encephalopathy    Skin: [ ] Normal   [ ] Ulcer(s) type [ ] Diabetic [ ] Pressure [ x] Other jaundiced  Stage        POA [ ]  Yes [ ]  No       [ ] Rash    LABS: no new labs for review.    Protein Calorie Malnutrition: [ ] Mild [ ] Moderate [x ] Severe    Oral Intake: [ ] Unable/mouth care only [ ] Minimal [ ] Moderate [ ] Full Capability  Diet: [ ] NPO [ ] Tube feeds [ ] TPN [ ] Other     RADIOLOGY & ADDITIONAL STUDIES: no new imaging for review    REFERRALS:   [ ] Chaplaincy  [ ] Hospice Care  [ ] Child Life  [ ] Social Work  [ ] Case management [ ] Nutrition [ ] Holistic Therapy [ ] Wound Care [ ]Physical Therapy [ ] Other [ ]See goals of care note in [place] [facility] Unit Progress Note [ ] Hospice Progress Note [ ]     HPI:  83F PMH COPD (on home 2L 02), bipolar disorder, htn, gerd, hypothyroidism presents from nursing home w/ worsening jaundice over past two weeks.  Also with light light yellow BMs.  Has had worsening epigastric pain as well as worsening rib and back pain .  Denies fevers, chills, chest pain, nausea, vomiting, cough.  She had labwork done at nursing home 9/1/17 which showed bilirubin 12.2 (9.9 direct), w/ , , alk phos 546 so was sent to [facility] for evaluation.  Reports decreased PO intake the past two days 2/2 no appetite. Denies weight loss.  CT A/P w/ common bile duct and moderate intrahepatic biliary dilatation, Pancreatic ductal dilatation, may be in part due to distal common bile duct stone although underlying duodenal/ampullary neoplasm cannot be excluded given soft tissue structure in the second portion of the duodenum. Pancreatic ductal dilatation and multiple indeterminate pancreatic lesions. Patient was admitted for obstructive jaundice. Transferred to PCU for symptom management.    Indication for Palliative Care Unit Services: Symptom management.    ADVANCE DIRECTIVES:    DNR Yes    MOLST  [ ] YES [x ] NO                      [ ] Completed  Health Care Proxy [ ] YES  [x ] NO   [ ] Completed  Living Will  [ ] YES [x ] NO             [ ] Surrogate  [ ] HCP  [ ] Guardian: Jason Muniz (son)  Phone#:    Allergies    penicillin (Unknown)    Intolerances    MEDICATIONS  (STANDING):  sodium chloride 0.9%. 1000 milliLiter(s) (10 mL/Hr) IV Continuous <Continuous>  nystatin Powder 1 Application(s) Topical two times a day  HYDROmorphone Infusion 4 mG/Hr (4 mL/Hr) IV Continuous <Continuous>    MEDICATIONS  (PRN):  acetaminophen  Suppository 650 milliGRAM(s) Rectal every 6 hours PRN For Temp greater than 38 C (100.4 F)  glycopyrrolate Injectable 0.4 milliGRAM(s) IV Push every 6 hours PRN Secretions  bisacodyl Suppository 10 milliGRAM(s) Rectal daily PRN Constipation  LORazepam   Injectable 0.5 milliGRAM(s) IV Push every 1 hour PRN Anxiety  HYDROmorphone  Injectable 8 milliGRAM(s) IV Push every 1 hour PRN Pain  HYDROmorphone  Injectable 8 milliGRAM(s) IV Push every 1 hour PRN Dyspnea      PRESENT SYMPTOMS:  Source: [ ] Patient   [ ] Family   [x ] Team     Pain:                        [x ] No [ ] Yes             [ ] Mild [ ] Moderate [ ] Severe    Dyspnea:                [x ] No [ ] Yes             [ ] Mild [ ] Moderate [ ] Severe    Anxiety:                  [ x] No [ ] Yes             [ ] Mild [ ] Moderate [ ] Severe    Fatigue:                  [x ] No [x ] Yes             [ ] Mild [ ] Moderate [x ] Severe    Nausea:                  [ ] No [ ] Yes             [ ] Mild [ ] Moderate [ ] Severe    Loss of appetite:   [ ] No [ ] Yes             [ ] Mild [ ] Moderate [ ] Severe    Constipation:        [x ] No [ ] Yes             [ ] Mild [ ] Moderate [ ] Severe    Other Symptoms:  [ ] All other review of systems negative   [x ] Unable to obtain due to poor mentation     Karnofsky Performance Score/Palliative Performance Status Version 2: 10  %    PHYSICAL EXAM:  Vital Signs Last 24 Hrs  T(C): 36.7 (17 Sep 2017 08:36), Max: 36.7 (17 Sep 2017 08:36)  T(F): 98 (17 Sep 2017 08:36), Max: 98 (17 Sep 2017 08:36)  HR: 78 (17 Sep 2017 08:36) (78 - 78)  BP: 99/62 (17 Sep 2017 08:36) (99/62 - 99/62)  BP(mean): --  RR: 16 (17 Sep 2017 08:36) (16 - 16)  SpO2: 96% (17 Sep 2017 08:36) (96% - 96%)    [ ] Alert  [ ] Oriented x      [x ] Lethargic  [ ] Agitated   [ ] Cachexia   [ ] Unarousable  [ ] Verbal  [ ] Non-Verbal    HEENT:  [ ] Normal   [x ] Dry mouth   [ ] ET Tube    [ ] Trach  [ ] Oral lesions    Lungs:   [x ] Clear [ ] Tachypnea  [ ] Audible excessive secretions   [ ] Rhonchi        [ ] Right [ ] Left [ ] Bilateral  [ ] Crackles        [ ] Right [ ] Left [ ] Bilateral  [ ] Wheezing     [ ] Right [ ] Left [ ] Bilateral  [ ] Respiratory failure [ ] Acute [ ] Chronic [ ] Hypoxemic [ ] Hypercarbic [ ] Other    Cardiovascular:   [x ] Regular [ ] Irregular [ ] Tachycardia   [ ] Bradycardia  [ ] Murmur [ ] Other  [ ] Shock [ ] Septic [ ] Hypovolemic [ ] Neurogenic [ ] Cardiogenic   [ ] Vasopressors [ ] Inotrophs    Abdomen:   [ ] Soft  [x ] Distended   [ ] +BS  [ ] Non tender [x ] Tender  [ ]PEG   [ ]OGT/ NGT   Last BM:     [ ] Other    Genitourinary:  [ ] Normal [ ] Incontinent   [ ] Oliguria/Anuria   [x ] Jimenes  [ ] Other       Musculoskeletal:    [ ] Normal   [x ] Weakness  [ ] Edema  [ ] Bedbound/Wheelchair bound [ ]  Ambulatory [ ] with [ ] without assistance [ ] Functional quadriplegia    Neurological: [ ] No focal deficits  [ ] Cognitive impairment  [ ] Dysphagia [ ] Dysarthria [ ] Paresis [ ] Other   [ ] Brain compression  [ ] Cerebral edema [x ] Encephalopathy    Skin: [ ] Normal   [ ] Ulcer(s) type [ ] Diabetic [ ] Pressure [ x] Other jaundiced  Stage        POA [ ]  Yes [ ]  No       [ ] Rash    LABS: no new labs for review.    Protein Calorie Malnutrition: [ ] Mild [ ] Moderate [x ] Severe    Oral Intake: [ ] Unable/mouth care only [ ] Minimal [ ] Moderate [ ] Full Capability  Diet: [ ] NPO [ ] Tube feeds [ ] TPN [ ] Other     RADIOLOGY & ADDITIONAL STUDIES: no new imaging for review    REFERRALS:   [ ] Chaplaincy  [ ] Hospice Care  [ ] Child Life  [ ] Social Work  [ ] Case management [ ] Nutrition [ ] Holistic Therapy [ ] Wound Care [ ]Physical Therapy [ ] Other [ ]See goals of care note in [place]

## 2017-09-18 NOTE — PROGRESS NOTE ADULT - PROVIDER SPECIALTY LIST ADULT
Gastroenterology
Infectious Disease
Internal Medicine
Palliative Care

## 2017-09-18 NOTE — PROGRESS NOTE ADULT - PROBLEM SELECTOR PROBLEM 2
Pancreatic mass
Common bile duct (CBD) obstruction
Ovarian mass
Pain
Common bile duct (CBD) obstruction

## 2017-09-18 NOTE — PROGRESS NOTE ADULT - PROBLEM SELECTOR PROBLEM 7
DNR (do not resuscitate)
DNR (do not resuscitate)
Hypothyroidism
Palliative care encounter

## 2017-09-18 NOTE — PROGRESS NOTE ADULT - PROBLEM SELECTOR PLAN 2
As per IR - pt poor candidate for IR perc drain 2/2 insufficient intrahepatic biliary dilation to allow for percutaneous biliary drainage.  No abx per ID as no evidence of cholangitis
- CT a/p shows common bile duct, intrahepatic, as well as pancreatic duct dilation, may be 2/2 distal common bile duct stone vs. pancreatic mass (multiple indeterminate pancreatic masses visualized).  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   -IR also declined perc dianelys placement  -Given lack of possible interventions, palliative has been consulted and is on board. Pt's HCP is agreeable to palliative care  - neg blood culture so far  -Per ID, pt does not seen to currently present with classic cholangitis, abx d/emperatriz on 9/7
- Indeterminate bilateral adnexal lesions on CT
- Indeterminate bilateral adnexal lesions on CT  - f/u pelvic ultrasound
- Indeterminate bilateral adnexal lesions on CT  - f/u pelvic ultrasound
-Indeterminate bilateral adnexal lesions on CT  - f/u pelvic ultrasound
As per IR - pt poor candidate for IR perc drain 2/2 insufficient intrahepatic biliary dilation to allow for percutaneous biliary drainage.  No abx per ID as no evidence of cholangitis
From invasive neoplasm.  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
Poor candidate for IR perc drain 2/2 insufficient intrahepatic biliary dilation to allow for percutaneous biliary drainage. No abx per ID as no evidence of cholangitis.
Poor candidate for IR perc drain 2/2 insufficient intrahepatic biliary dilation to allow for percutaneous biliary drainage. No abx per ID as no evidence of cholangitis.  c/w full support and symptom management
Poor candidate for IR perc drain 2/2 insufficient intrahepatic biliary dilation to allow for percutaneous biliary drainage. No antibiotics per ID as no evidence of cholangitis.
Poor candidate for IR perc drain 2/2 insufficient intrahepatic biliary dilation to allow for percutaneous biliary drainage. No abx per ID as no evidence of cholangitis.
DDT not to be pursued.

## 2017-09-18 NOTE — PROGRESS NOTE ADULT - PROBLEM SELECTOR PROBLEM 6
Palliative care encounter
Agitation
HTN (Hypertension)
Palliative care encounter

## 2017-09-18 NOTE — PROGRESS NOTE ADULT - PROBLEM SELECTOR PLAN 4
Very comfortable on diaudid drip hourly dose increased to 0.7 mg q hr with 1.4 mg PRN for breakthrough yesterday  Needed PRNs in evening prior to bathing (two prior PRNs recorded yesterday were prior to adjustment of picc medication dosing)
- has bilateral calf pain, no erythema, no swelling . Denies history of DVTs.  - f/u LE dopplers
KPS 10%
Met with son at bedside who is tearful but thankful that his mother is comfortable.  Agrees to continue care in PCU when bed becomes available.
On Dilaudid drip @2mg/hr with 4mg PRN for breakthrough.
On Dilaudid drip @2mg/hr with 4mg PRN for breakthrough. One prn used for pain/24 hours.
On Dilaudid drip @4mg/hr with 8mg PRN for breakthrough. Used 2 PRN in last 24 hours.
On Dilaudid drip @4mg/hr with 8mg PRN for breakthrough. Used 3 breakthroughs of prior 4mg while on 2mg drip between 2-7pm yesterday, and drip was increased according to dilaudid requirements as noted above. No new breakthrough doses of 8mg used since change.
Very comfortable on diaudid drip, hourly dose increased to 0.7 mg q hr with 1.4 mg PRN for breakthrough.
On Dilaudid drip 1mg/hr with 2mg PRN for breakthrough.
Patient seen and case discussed with team attending.  Agree with institution of full comfort care and use of IV dilaudid infusion for management of pain secondary to neoplasm.  May need to narrow dosing interval for PRN's.  Patient may go to PCU if bed becomes available.  Team discussed advanced directives with family.  DNR/DNI in place.

## 2017-09-18 NOTE — PROGRESS NOTE ADULT - PROBLEM SELECTOR PLAN 7
DNI as well  In chart, signed by son Jason 2/2 to encephalopathy
- continue synthroid
- synthroid d/emperatriz as pt cannot tolerate PO
- synthroid d/emperatriz as pt cannot tolerate PO
DNI as well  In chart, signed by son Jason 2/2 to encephalopathy
Symptoms well controlled. Patient in active dying process. DNR/DNI. Dilaudid prn used for as above/24 hours.

## 2017-09-18 NOTE — PROGRESS NOTE ADULT - PROBLEM SELECTOR PROBLEM 4
Palliative care encounter
Pain management
Calf pain
Debility
Pain management
Palliative care encounter
Pain management

## 2017-09-18 NOTE — PROGRESS NOTE ADULT - SUBJECTIVE AND OBJECTIVE BOX
Geriatric and Palliative Care Unit Progress Note [X] Hospice Progress Note [ ]     HPI:  83F PMH COPD (on home 2L 02), bipolar disorder, htn, gerd, hypothyroidism presents from nursing home w/ worsening jaundice over past two weeks.  Also with light light yellow BMs.  Has had worsening epigastric pain as well as worsening rib and back pain .  Denies fevers, chills, chest pain, nausea, vomiting, cough.  She had labwork done at nursing home 9/1/17 which showed bilirubin 12.2 (9.9 direct), w/ , , alk phos 546 so was sent to [facility] for evaluation.  Reports decreased PO intake the past two days 2/2 no appetite. Denies weight loss.  CT A/P w/ common bile duct and moderate intrahepatic biliary dilatation, Pancreatic ductal dilatation, may be in part due to distal common bile duct stone although underlying duodenal/ampullary neoplasm cannot be excluded given soft tissue structure in the second portion of the duodenum. Pancreatic ductal dilatation and multiple indeterminate pancreatic lesions. Patient was admitted for obstructive jaundice. Transferred to PCU for symptom management.    Indication for Palliative Care Unit Services: Symptom management.    ADVANCE DIRECTIVES:    DNR Yes    MOLST  [ ] YES [x ] NO                      [ ] Completed  Health Care Proxy [ ] YES  [x ] NO   [ ] Completed  Living Will  [ ] YES [x ] NO             [ ] Surrogate  [ ] HCP  [ ] Guardian: Jason Muniz (son)  Phone#:    Allergies    penicillin (Unknown)    Intolerances    MEDICATIONS  (STANDING):  sodium chloride 0.9%. 1000 milliLiter(s) (10 mL/Hr) IV Continuous <Continuous>  nystatin Powder 1 Application(s) Topical two times a day  HYDROmorphone Infusion 4 mG/Hr (4 mL/Hr) IV Continuous <Continuous>    MEDICATIONS  (PRN):  acetaminophen  Suppository 650 milliGRAM(s) Rectal every 6 hours PRN For Temp greater than 38 C (100.4 F)  glycopyrrolate Injectable 0.4 milliGRAM(s) IV Push every 6 hours PRN Secretions  bisacodyl Suppository 10 milliGRAM(s) Rectal daily PRN Constipation  LORazepam   Injectable 0.5 milliGRAM(s) IV Push every 1 hour PRN Anxiety  HYDROmorphone  Injectable 8 milliGRAM(s) IV Push every 1 hour PRN Pain  HYDROmorphone  Injectable 8 milliGRAM(s) IV Push every 1 hour PRN Dyspnea      PRESENT SYMPTOMS:  Source: [ ] Patient   [ ] Family   [x ] Team     Pain:                        [x ] No [ ] Yes             [ ] Mild [ ] Moderate [ ] Severe    Dyspnea:                [x ] No [ ] Yes             [ ] Mild [ ] Moderate [ ] Severe    Anxiety:                  [ x] No [ ] Yes             [ ] Mild [ ] Moderate [ ] Severe    Fatigue:                  [x ] No [x ] Yes             [ ] Mild [ ] Moderate [x ] Severe    Nausea:                  [ ] No [ ] Yes             [ ] Mild [ ] Moderate [ ] Severe    Loss of appetite:   [ ] No [ ] Yes             [ ] Mild [ ] Moderate [ ] Severe    Constipation:        [x ] No [ ] Yes             [ ] Mild [ ] Moderate [ ] Severe    Other Symptoms:  [ ] All other review of systems negative   [x ] Unable to obtain due to poor mentation     Karnofsky Performance Score/Palliative Performance Status Version 2: 10  %    PHYSICAL EXAM:  Vital Signs Last 24 Hrs  T(C): 37 (18 Sep 2017 08:23), Max: 37 (18 Sep 2017 08:23)  T(F): 98.6 (18 Sep 2017 08:23), Max: 98.6 (18 Sep 2017 08:23)  HR: 79 (18 Sep 2017 08:23) (79 - 79)  BP: 90/48 (18 Sep 2017 08:23) (90/48 - 90/48)  BP(mean): --  RR: 16 (18 Sep 2017 08:23) (16 - 16)  SpO2: 96% (18 Sep 2017 08:23) (96% - 96%)    [ ] Alert  [ ] Oriented x      [x ] Lethargic  [ ] Agitated   [ ] Cachexia   [ ] Unarousable  [ ] Verbal  [ ] Non-Verbal    HEENT:  [ ] Normal   [x ] Dry mouth   [ ] ET Tube    [ ] Trach  [ ] Oral lesions    Lungs:   [x ] Clear [ ] Tachypnea  [ ] Audible excessive secretions   [ ] Rhonchi        [ ] Right [ ] Left [ ] Bilateral  [ ] Crackles        [ ] Right [ ] Left [ ] Bilateral  [ ] Wheezing     [ ] Right [ ] Left [ ] Bilateral  [ ] Respiratory failure [ ] Acute [ ] Chronic [ ] Hypoxemic [ ] Hypercarbic [ ] Other    Cardiovascular:   [x ] Regular [ ] Irregular [ ] Tachycardia   [ ] Bradycardia  [ ] Murmur [ ] Other  [ ] Shock [ ] Septic [ ] Hypovolemic [ ] Neurogenic [ ] Cardiogenic   [ ] Vasopressors [ ] Inotrophs    Abdomen:   [ ] Soft  [x ] Distended   [ ] +BS  [ ] Non tender [x ] Tender  [ ]PEG   [ ]OGT/ NGT   Last BM:     [ ] Other    Genitourinary:  [ ] Normal [ ] Incontinent   [ ] Oliguria/Anuria   [x ] Jimenes  [ ] Other       Musculoskeletal:    [ ] Normal   [x ] Weakness  [ ] Edema  [ ] Bedbound/Wheelchair bound [ ]  Ambulatory [ ] with [ ] without assistance [ ] Functional quadriplegia    Neurological: [ ] No focal deficits  [ ] Cognitive impairment  [ ] Dysphagia [ ] Dysarthria [ ] Paresis [ ] Other   [ ] Brain compression  [ ] Cerebral edema [x ] Encephalopathy    Skin: [ ] Normal   [ ] Ulcer(s) type [ ] Diabetic [ ] Pressure [ x] Other jaundiced  Stage        POA [ ]  Yes [ ]  No       [ ] Rash    LABS: no new labs for review.    Protein Calorie Malnutrition: [ ] Mild [ ] Moderate [x ] Severe    Oral Intake: [ ] Unable/mouth care only [ ] Minimal [ ] Moderate [ ] Full Capability  Diet: [ ] NPO [ ] Tube feeds [ ] TPN [ ] Other     RADIOLOGY & ADDITIONAL STUDIES: no new imaging for review    REFERRALS:   [ ] Chaplaincy  [ ] Hospice Care  [ ] Child Life  [ ] Social Work  [ ] Case management [ ] Nutrition [ ] Holistic Therapy [ ] Wound Care [ ]Physical Therapy [ ] Other [ ]See goals of care note in [place]

## 2017-09-18 NOTE — PROGRESS NOTE ADULT - ASSESSMENT
83F PMH COPD (on home 2L 02), bipolar disorder, htn, gerd, hypothyroidism presents from nursing home w/ worsening jaundice, e w/ labs suggestive of obstructive juandice which may be 2/2 biliary/pancreatic malignancy vs gallstones, not candidate of invasive diagostic/treatment procedures. Transferred to the PCU on evening of 9/8 for the institution of full comfort care and use of IV dilaudid infusion for management of pain secondary to neoplasm.
83 year old female with new pancreatic mass, jaundice on comfort care, son at bedside.
83F PMH COPD (on home 2L 02), bipolar disorder, htn, gerd, hypothyroidism presents from nursing home w/ worsening jaundice, e w/ labs suggestive of obstructive juandice which may be 2/2 biliary/pancreatic malignancy vs gallstones, not candidate of invasive diagostic/treatment procedures. Transferred to the PCU on evening of 9/8 for the institution of full comfort care and use of IV dilaudid infusion for management of pain secondary to neoplasm now actively dying
83F PMH COPD (on home 2L 02), bipolar disorder, htn, gerd, hypothyroidism presents from nursing home w/ worsening jaundice, e w/ labs suggestive of obstructive juandice which may be 2/2 biliary/pancreatic malignancy vs gallstones, not candidate of invasive diagostic/treatment procedures. Transferred to the PCU on evening of 9/8 for the institution of full comfort care and use of IV dilaudid infusion for management of pain secondary to neoplasm.
83F w/ hx of COPD (on home 2L 02), bipolar disorder, htn, gerd, hypothyroidism presents from nursing home w/ epigastric pain and jaundice w/ labs suggestive of obstructive juandice which may be 2/2 biliary/pancreatic malignancy based on MRCP, high risk for anesthesia to do ERCP and not a candidate for IR guided cholecystostomy. Also found to have bilateral adnexal masses on CT abdomen and pelvis. Palliative Care consulted to assist with goals of care.
83F w/ hx of COPD (on home 2L 02), bipolar disorder, htn, gerd, hypothyroidism presents from nursing home w/ epigastric pain and jaundice w/ labs suggestive of obstuctive juandice which may be 2/2 biliary/pancreatic malignancy vs gallstones
Biliary obstruction.  No clear evidence of cholangitis.    I suggest that at this point the abx be stopped.    Please call if further issues/question.    ID to see PRN.
EUS/ERCP deferred.  Biliary tract obstructed.  Not a classic case of cholangitis but certainly at risk.    Continue the cefepime and flagyl pending intervention on the hepatobiliary system.    Once the biliary system is decompressed, I'd be inclined to plan dc of the abx.
IMP:    Biliary obstruction due to pancreatic head mass    PLAN:  - after discussion with anesthesia, pt deemed poor candidate for EUS/ERCP with general anesthesia given significant comorbities. as per IR - pt poor candidate for IR perc drain as well.   - agree with palliative care evaluation  - continue with antibiotics  - trend LFTs  - supportive care  - diet as tolerated.
Presumed biliary obstruction leading to the current LFT profile.    Can continue the cefepime and flagyl pending cultures and the decision for any intervention on the biliary system
83F PMH COPD (on home 2L 02), bipolar disorder, htn, gerd, hypothyroidism presents from nursing home w/ worsening jaundice, e w/ labs suggestive of obstructive juandice which may be 2/2 biliary/pancreatic malignancy vs gallstones, not candidate of invasive diagostic/treatment procedures. Transferred to the PCU on evening of 9/8 for the institution of full comfort care and use of IV dilaudid infusion for management of pain secondary to neoplasm.

## 2017-09-18 NOTE — PROGRESS NOTE ADULT - PROBLEM SELECTOR PROBLEM 1
Cancer of ampulla of Vater
Common bile duct (CBD) obstruction
Lethargy
Pain
Pancreatic mass
Cancer of ampulla of Vater

## 2017-09-18 NOTE — PROGRESS NOTE ADULT - PROBLEM SELECTOR PROBLEM 3
Ovarian mass
Ovarian mass
Bladder wall thickening
Debility
Ovarian mass
Pain management
Ovarian mass

## 2017-09-18 NOTE — PROGRESS NOTE ADULT - PROBLEM SELECTOR PLAN 3
Indeterminate bilateral adnexal lesions on CT; no plans for further evaluation or disease-directed therapy.

## 2017-09-18 NOTE — PROGRESS NOTE ADULT - PROBLEM SELECTOR PROBLEM 5
Debility
COPD (chronic obstructive pulmonary disease)
Debility
Palliative care encounter
Debility

## 2017-09-19 NOTE — DISCHARGE NOTE FOR THE EXPIRED PATIENT - HOSPITAL COURSE
83F PMH COPD (on home 2L 02), bipolar disorder, htn, gerd, hypothyroidism presents from nursing home w/ worsening jaundice over past two weeks.  Also with light light yellow BMs.  Has had worsening epigastric pain as well as worsening rib and back pain .  Denies fevers, chills, chest pain, nausea, vomiting, cough.  She had labwork done at nursing home 9/1/17 which showed bilirubin 12.2 (9.9 direct), w/ , , alk phos 546 so was sent to [facility] for evaluation.  Reports decreased PO intake the past two days 2/2 no appetite. Denies weight loss.  CT A/P w/ common bile duct and moderate intrahepatic biliary dilatation, Pancreatic ductal dilatation, may be in part due to distal common bile duct stone although underlying duodenal/ampullary neoplasm cannot be excluded given soft tissue structure in the second portion of the duodenum. Pancreatic ductal dilatation and multiple indeterminate pancreatic lesions. Patient was admitted for obstructive jaundice. Transferred to PCU for symptom management, and managed with a dilaudid gtt, where she passed away peacefully on 9/19/17.

## 2017-09-19 NOTE — PROVIDER CONTACT NOTE (OTHER) - ASSESSMENT
No response to external stimuli.  No spontaneous respirations.  No apical heart rate.  Negative pupillary response to light.

## 2017-10-19 PROCEDURE — 99285 EMERGENCY DEPT VISIT HI MDM: CPT | Mod: 25

## 2017-10-19 PROCEDURE — 86900 BLOOD TYPING SEROLOGIC ABO: CPT

## 2017-10-19 PROCEDURE — 85027 COMPLETE CBC AUTOMATED: CPT

## 2017-10-19 PROCEDURE — 93970 EXTREMITY STUDY: CPT

## 2017-10-19 PROCEDURE — 85014 HEMATOCRIT: CPT

## 2017-10-19 PROCEDURE — 81001 URINALYSIS AUTO W/SCOPE: CPT

## 2017-10-19 PROCEDURE — 82330 ASSAY OF CALCIUM: CPT

## 2017-10-19 PROCEDURE — 93005 ELECTROCARDIOGRAM TRACING: CPT

## 2017-10-19 PROCEDURE — 82247 BILIRUBIN TOTAL: CPT

## 2017-10-19 PROCEDURE — 84443 ASSAY THYROID STIM HORMONE: CPT

## 2017-10-19 PROCEDURE — 96375 TX/PRO/DX INJ NEW DRUG ADDON: CPT

## 2017-10-19 PROCEDURE — 82435 ASSAY OF BLOOD CHLORIDE: CPT

## 2017-10-19 PROCEDURE — A9585: CPT

## 2017-10-19 PROCEDURE — 96374 THER/PROPH/DIAG INJ IV PUSH: CPT | Mod: XU

## 2017-10-19 PROCEDURE — 82565 ASSAY OF CREATININE: CPT

## 2017-10-19 PROCEDURE — 83735 ASSAY OF MAGNESIUM: CPT

## 2017-10-19 PROCEDURE — 82248 BILIRUBIN DIRECT: CPT

## 2017-10-19 PROCEDURE — 84100 ASSAY OF PHOSPHORUS: CPT

## 2017-10-19 PROCEDURE — 85610 PROTHROMBIN TIME: CPT

## 2017-10-19 PROCEDURE — 84295 ASSAY OF SERUM SODIUM: CPT

## 2017-10-19 PROCEDURE — 82803 BLOOD GASES ANY COMBINATION: CPT

## 2017-10-19 PROCEDURE — 87086 URINE CULTURE/COLONY COUNT: CPT

## 2017-10-19 PROCEDURE — 70450 CT HEAD/BRAIN W/O DYE: CPT

## 2017-10-19 PROCEDURE — 83605 ASSAY OF LACTIC ACID: CPT

## 2017-10-19 PROCEDURE — 86850 RBC ANTIBODY SCREEN: CPT

## 2017-10-19 PROCEDURE — 83690 ASSAY OF LIPASE: CPT

## 2017-10-19 PROCEDURE — 74177 CT ABD & PELVIS W/CONTRAST: CPT

## 2017-10-19 PROCEDURE — 80164 ASSAY DIPROPYLACETIC ACD TOT: CPT

## 2017-10-19 PROCEDURE — 96376 TX/PRO/DX INJ SAME DRUG ADON: CPT

## 2017-10-19 PROCEDURE — 85730 THROMBOPLASTIN TIME PARTIAL: CPT

## 2017-10-19 PROCEDURE — 80053 COMPREHEN METABOLIC PANEL: CPT

## 2017-10-19 PROCEDURE — 82947 ASSAY GLUCOSE BLOOD QUANT: CPT

## 2017-10-19 PROCEDURE — 87040 BLOOD CULTURE FOR BACTERIA: CPT

## 2017-10-19 PROCEDURE — 86901 BLOOD TYPING SEROLOGIC RH(D): CPT

## 2017-10-19 PROCEDURE — 84132 ASSAY OF SERUM POTASSIUM: CPT

## 2017-10-19 PROCEDURE — 74182 MRI ABDOMEN W/CONTRAST: CPT
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
## 2023-01-03 NOTE — PROGRESS NOTE ADULT - PROBLEM/PLAN-8
[address] [facility]  [address] [place] [address]  Dept: [phone]  Dept Fax: [phone]     Cameron Watson is a 25 y.o. male who presents to the urgent care today for his medicalconditions/complaints as noted below. Christie Mcclain is c/o of Cough and Sinus Problem (Sinus infection last week pt is coughing up green mucus started yesterday)    HPI:      Sinus Problem  This is a new problem. The current episode started yesterday. The problem has been gradually worsening since onset. Maximum temperature: unmeasured. The fever has been present for 1 to 2 days. Associated symptoms include congestion, coughing, headaches and a sore throat. Pertinent negatives include no chills, ear pain, shortness of breath, sinus pressure or sneezing. Treatments tried: otc tx. The treatment provided no relief. History reviewed. No pertinent past medical history. Current Outpatient Medications   Medication Sig Dispense Refill    oseltamivir (TAMIFLU) 75 MG capsule Take 1 capsule by mouth 2 times daily for 5 days 10 capsule 0    benzonatate (TESSALON) 200 MG capsule Take 1 capsule by mouth 3 times daily as needed for Cough 20 capsule 0    clobetasol (TEMOVATE) 0.05 % cream Apply topically 2 times daily. (Patient not taking: Reported on 1/3/2023) 60 g 0     No current facility-administered medications for this visit. Allergies   Allergen Reactions    Pollen Extract     Seasonal Other (See Comments)     Watery & itchy eyes, head congestion     Reviewed PMH, SH, and FH with the patient and updated. Subjective:      Review of Systems   Constitutional:  Positive for fatigue and fever. Negative for chills. HENT:  Positive for congestion, postnasal drip and sore throat. Negative for ear discharge, ear pain, sinus pressure, sneezing and voice change. Eyes:  Negative for discharge and redness. Respiratory:  Positive for cough.  Negative for chest tightness, shortness of breath and wheezing. Cardiovascular: Negative. Negative for chest pain. Musculoskeletal:  Positive for myalgias (back). Skin:  Negative for rash. Neurological:  Positive for headaches. Negative for dizziness, weakness and light-headedness. Hematological:  Negative for adenopathy. All other systems reviewed and are negative. Objective:      Physical Exam  Vitals and nursing note reviewed. Constitutional:       General: He is not in acute distress. Appearance: Normal appearance. He is well-developed. He is not ill-appearing, toxic-appearing or diaphoretic. HENT:      Head: Normocephalic. Right Ear: Tympanic membrane and external ear normal.      Left Ear: Tympanic membrane and external ear normal.      Nose: Nose normal.      Right Sinus: No maxillary sinus tenderness or frontal sinus tenderness. Left Sinus: No maxillary sinus tenderness or frontal sinus tenderness. Mouth/Throat:      Pharynx: Oropharyngeal exudate (PND) and posterior oropharyngeal erythema present. Eyes:      General:         Right eye: No discharge. Left eye: No discharge. Cardiovascular:      Rate and Rhythm: Normal rate and regular rhythm. Heart sounds: Normal heart sounds. No murmur heard. Pulmonary:      Effort: Pulmonary effort is normal. No respiratory distress. Breath sounds: Normal breath sounds. No wheezing or rales. Lymphadenopathy:      Cervical: No cervical adenopathy. Skin:     General: Skin is warm. Findings: No rash. Neurological:      Mental Status: He is alert. /73   Pulse 90   Temp 98.7 °F (37.1 °C)   Ht 5' 8\" (1.727 m)   Wt 180 lb (81.6 kg)   SpO2 97%   BMI 27.37 kg/m²     Results for orders placed or performed in visit on 01/03/23   POCT Influenza A/B   Result Value Ref Range    Influenza A Ab positive     Influenza B Ab negative      Assessment:       Diagnosis Orders   1.  Influenza A  oseltamivir (TAMIFLU) 75 MG capsule    benzonatate (TESSALON) 200 MG capsule      2. Fever in other diseases  POCT Influenza A/B        Plan:      Determined that treatment with Tamiflu was appropriate at this time. Tylenol/Motrin as needed for fever/myalgia. May use OTC flu/cold preparations if desired. Education provided regarding influenza at this time. Encouraged good hand and respiratory hygiene. Patient agreeable to treatment plan. Follow up if symptoms do not improve/worsen. Discussed symptoms that will warrant urgent ED evaluation/treatment. Orders Placed This Encounter   Medications    oseltamivir (TAMIFLU) 75 MG capsule     Sig: Take 1 capsule by mouth 2 times daily for 5 days     Dispense:  10 capsule     Refill:  0    benzonatate (TESSALON) 200 MG capsule     Sig: Take 1 capsule by mouth 3 times daily as needed for Cough     Dispense:  20 capsule     Refill:  0        Patient given educational materials - see patient instructions. Discussed use, benefit, and side effects of prescribed medications. All patientquestions answered. Pt voiced understanding.     Electronically signed by MIAH Hernandez CNP on 1/3/2023at 11:38 AM DISPLAY PLAN FREE TEXT

## 2023-05-16 NOTE — PROGRESS NOTE ADULT - PROBLEM SELECTOR PLAN 9
Aleksandra [facility] 75  coding opportunities       Chart reviewed, no opportunity found:   [address]        Patients Insurance     Medicare Insurance: [employer] Advantage - Lovenox for DVT ppx

## 2023-08-10 NOTE — PROGRESS NOTE ADULT - PROBLEM SELECTOR PROBLEM 8
Please let patient know her Vitamin D levels were low and I would like her to take once WEEKLY supplements for a total of 4 doses  The rest of her labs were stable, except for the low platelet counts which I already discussed with her  A referral to Hematology was placed for this and I would like her to repeat a CBC in 2 weeks  Thanks  English Bipolar disorder

## 2023-12-10 NOTE — PROGRESS NOTE ADULT - SUBJECTIVE AND OBJECTIVE BOX
Geriatric and Palliative Care Unit Progress Note [ x] Hospice Progress Note [ ]     HPI:  83F PMH COPD (on home 2L 02), bipolar disorder, htn, gerd, hypothyroidism presents from nursing home w/ worsening jaundice over past two weeks.  Also with light light yellow BMs.  Has had worsening epigastric pain as well as worsening rib and back pain .  Denies fevers, chills, chest pain, nausea, vomiting, cough.  She had labwork done at nursing home 9/1/17 which showed bilirubin 12.2 (9.9 direct), w/ , , alk phos 546 so was sent to [facility] for evaluation.  Reports decreased PO intake the past two days 2/2 no appetite. Denies weight loss.      In the ED VS 98.6 92 100/66 16 99%RA  Labs notable for WBC 11.7 Hgb 10.8, .  BUN 13 Cr 0.91 glucose 115. Alb 3.1 Bili 13.5, 12.5 direct. Alk phos 588      CT A/P w/ common bile duct and moderate intrahepatic biliary dilatation, Pancreatic ductal dilatation, may be in part due to distal common bile duct   stone although underlying duodenal/ampullary neoplasm cannot be excluded given soft tissue structure in the second portion of the duodenum. This   can be further characterized on a follow-up MRI/MRCP. Pancreatic ductal dilatation and multiple indeterminate pancreatic lesions, which may be in part due to IPMN and can also be characterized  on a follow-up MRI/MRCP.   Nonspecific mild peripancreatic stranding. Recommend correlation with serum lipase to assess acute pancreatitis.  Indeterminate bilateral adnexal lesions. Borderline endometrium. Endometrial/ovarian neoplasm cannot be excluded. Recommend follow-up.  Bladder wall thickening and mucosal enhancement. Recommend correlation with urinalysis to assess cystitis.  Elevated right hemidiaphragm with right lower lobe atelectasis. Nonspecific patchy opacity in the left lower lobe. Recommend clinical correlation to assess pneumonia and follow-up to assess resolution.    In the ED, given morphine and cefepime x 1. (02 Sep 2017 04:24)    Indication for Palliative Care Unit Services: here because she is actively dying and need symptom control.  Pt more comfortable on higher dilaudid drip  and used one ativan in 24H unable to do ros due to condition    ADVANCE DIRECTIVES:    DNR [ x] YES [ ] NO                            [ ] Completed  MOLST  [ ] YES [ ] NO                      [ ] Completed  Health Care Proxy [ ] YES  [ ] NO   [ ] Completed  Living Will  [ ] YES [ ] NO             [ x] Surrogate  [ ] HCP  [ ] Guardian:                                                                  Phone#:    Allergies    penicillin (Unknown)    Intolerances        MEDICATIONS  (STANDING):  sodium chloride 0.9%. 1000 milliLiter(s) (10 mL/Hr) IV Continuous <Continuous>  nystatin Powder 1 Application(s) Topical two times a day  HYDROmorphone Infusion 2 mG/Hr (2 mL/Hr) IV Continuous <Continuous>    MEDICATIONS  (PRN):  acetaminophen  Suppository 650 milliGRAM(s) Rectal every 6 hours PRN For Temp greater than 38 C (100.4 F)  glycopyrrolate Injectable 0.4 milliGRAM(s) IV Push every 6 hours PRN Secretions  bisacodyl Suppository 10 milliGRAM(s) Rectal daily PRN Constipation  LORazepam   Injectable 0.5 milliGRAM(s) IV Push every 1 hour PRN Anxiety  HYDROmorphone  Injectable 4 milliGRAM(s) IV Push every 1 hour PRN Pain  HYDROmorphone  Injectable 4 milliGRAM(s) IV Push every 1 hour PRN Dyspnea      PRESENT SYMPTOMS:  Source: [ ] Patient   [ ] Family   [x ] Team     Pain:                        [x ] No [ ] Yes             [ ] Mild [ ] Moderate [ ] Severe    Onset -  Location -  Duration -  Character -  Alleviating/Aggravating -  Radiation -  Timing -      Dyspnea:                [x ] No [ ] Yes             [ ] Mild [ ] Moderate [ ] Severe    Anxiety:                  [x ] No [ ] Yes             [ ] Mild [ ] Moderate [ ] Severe    Fatigue:                  [ ] No [ x] Yes             [ ] Mild [ ] Moderate [x ] Severe    Nausea:                  [x ] No [ ] Yes             [ ] Mild [ ] Moderate [ ] Severe    Loss of appetite:   [ ] No [x ] Yes             [ ] Mild [ ] Moderate [ ] Severe    Constipation:        [x ] No [ ] Yes             [ ] Mild [ ] Moderate [ ] Severe    Other Symptoms:  [ ] All other review of systems negative   [x ] Unable to obtain due to poor mentation     Karnofsky Performance Score/Palliative Performance Status Version 2:   20      %    PHYSICAL EXAM:  Vital Signs Last 24 Hrs  T(C): 36.6 (14 Sep 2017 08:00), Max: 36.6 (14 Sep 2017 08:00)  T(F): 97.9 (14 Sep 2017 08:00), Max: 97.9 (14 Sep 2017 08:00)  HR: 93 (14 Sep 2017 08:00) (93 - 93)  BP: 109/66 (14 Sep 2017 08:00) (109/66 - 109/66)  BP(mean): --  RR: 16 (14 Sep 2017 08:00) (16 - 16)  SpO2: 94% (14 Sep 2017 08:00) (94% - 94%) I&O's Summary    13 Sep 2017 07:01  -  14 Sep 2017 07:00  --------------------------------------------------------  IN: 140 mL / OUT: 0 mL / NET: 140 mL        General:  [ ] Alert  [ ] Oriented x      [ ] Lethargic  [ ] Agitated   [x ] Cachexia   [x ] Unarousable  [ ] Verbal  [x ] Non-Verbal    HEENT:  [ x] Normal   [ ] Dry mouth   [ ] ET Tube    [ ] Trach  [ ] Oral lesions    Lungs:   [x ] Clear [ ] Tachypnea  [ ] Audible excessive secretions   [ ] Rhonchi        [ ] Right [ ] Left [ ] Bilateral  [ ] Crackles        [ ] Right [ ] Left [ ] Bilateral  [ ] Wheezing     [ ] Right [ ] Left [ ] Bilateral  [ ] Respiratory failure [ ] Acute [ ] Chronic [ ] Hypoxemic [ ] Hypercarbic [ ] Other    Cardiovascular:   [x ] Regular [ ] Irregular [ ] Tachycardia   [ ] Bradycardia  [ ] Murmur [ ] Other  [ ] Shock [ ] Septic [ ] Hypovolemic [ ] Neurogenic [ ] Cardiogenic   [ ] Vasopressors [ ] Inotrophs    Abdomen:   [x ] Soft  [ ] Distended   [ ] +BS  [ ] Non tender [ ] Tender  [ ]PEG   [ ]OGT/ NGT   Last BM:     [ ] Other    Genitourinary:  [ ] Normal [x ] Incontinent   [ ] Oliguria/Anuria   [ ] Jimenes  [ ] Other       Musculoskeletal:    [ ] Normal   [ ] Weakness  [ ] Edema  [x ] Bedbound/Wheelchair bound [ ]  Ambulatory [ ] with [ ] without assistance [x ] Functional quadriplegia    Neurological: [ ] No focal deficits  [ ] Cognitive impairment  [ ] Dysphagia [ ] Dysarthria [ ] Paresis [ ] Other   [ ] Brain compression  [ ] Cerebral edema [ x] Encephalopathy    Skin: [x ] Normal   [ ] Ulcer(s) type [ ] Diabetic [ ] Pressure [ ] Other   Stage        POA [ ]  Yes [ ]  No       [ ] Rash    LABS:    none            Protein Calorie Malnutrition: [ ] Mild [ ] Moderate [ ] Severe    Oral Intake: [ ] Unable/mouth care only [ ] Minimal [ ] Moderate [ ] Full Capability  Diet: [ ] NPO [ ] Tube feeds [ ] TPN [ ] Other     RADIOLOGY & ADDITIONAL STUDIES:    REFERRALS:   [ ] Chaplaincy  [x ] Hospice Care  [ ] Child Life  [ ] Social Work  [ ] Case management [ ] Nutrition [ ] Holistic Therapy [ ] Wound Care [ ]Physical Therapy [ ] Other [ ]See goals of care note in [place] no

## 2025-05-20 NOTE — PROGRESS NOTE ADULT - SUBJECTIVE AND OBJECTIVE BOX
Case Management   Daily Progress Note       Patient Name: Hemanth Barrera                   YOB: 1935  Diagnosis: VARSHA (acute kidney injury) [N17.9]  Dyspnea, unspecified type [R06.00]                       GMLOS: 3 days  Length of Stay: 2  days    Readmission Risk (Low < 19, Mod (19-27), High > 27): Readmission Risk Score: 31      Patient is alert and oriented.    Spoke with Pt. and Current Transitional Plan is:    [] Home Independently    [x] Home with HC, [place] [facility] w/Son.    [] Skilled Nursing Facility    [] Acute Rehabilitation    [] Long Term Acute Care (LTAC)    [] Other:     Medical Management: Sating 98% on RA, IV steroids, 40 MG, Q8. CR+ 1.0, PO Bumex, Nephro has signed off.     Testing Ordered: GI- EGD w/ Possible Dilatation on Thursday. Eliquis on Hold. Cardio- Per Notes, Moderate Risk for EGD.    Additional Notes: PT/OT on board. Will follow for rec needs.     Electronically signed by Jill Glover RN on 5/20/2025 at 3:27 PM           Geriatric and Palliative Care Unit Progress Note [x] Hospice Progress Note [ ]     HPI:  83F PMH COPD (on home 2L 02), bipolar disorder, htn, gerd, hypothyroidism presents from nursing home w/ worsening jaundice, e w/ labs suggestive of obstuctive juandice which may be 2/2 biliary/pancreatic malignancy vs gallstones. 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 IR also declined perc idanelys placement. Given lack of possible interventions, palliative has been consulted.  Patient was transferred to the PCU on evening of 9/8 for the institution of full comfort care and use of IV dilaudid infusion for management of pain secondary to neoplasm.     Patient is active, appears comfortable. She does require PRN medications prior to personal care/turning.      Indication for Palliative Care Unit Services: Symptom control    ADVANCE DIRECTIVES:    DNR [x] YES  [ ] NO                            MOLST  [x] YES [ ] NO                        Health Care Proxy [ ] YES  [x] NO     Living Will  [ ] YES [ ] NO             [x] Surrogate  [ ] HCP  [ ] Guardian:     Shorty Muniz                                                             Phone#: [phone]    Allergies    penicillin (Unknown)      MEDICATIONS  (STANDING):  sodium chloride 0.9%. 1000 milliLiter(s) (10 mL/Hr) IV Continuous <Continuous>  nystatin Powder 1 Application(s) Topical two times a day  HYDROmorphone Infusion 0.7 mG/Hr (0.7 mL/Hr) IV Continuous <Continuous>    MEDICATIONS  (PRN):  acetaminophen  Suppository 650 milliGRAM(s) Rectal every 6 hours PRN For Temp greater than 38 C (100.4 F)  glycopyrrolate Injectable 0.4 milliGRAM(s) IV Push every 6 hours PRN Secretions  bisacodyl Suppository 10 milliGRAM(s) Rectal daily PRN Constipation  LORazepam   Injectable 0.5 milliGRAM(s) IV Push every 6 hours PRN Anxiety  HYDROmorphone  Injectable 1.4 milliGRAM(s) IV Push every 1 hour PRN dyspnea      PRESENT SYMPTOMS:  Source: [ ] Patient   [ ] Family   [x] Team     Pain:                        [x] No [ ] Yes             [ ] Mild [ ] Moderate [ ] Severe    No pain observed on rounds, though was endorsed to that she flinches/squints from pain when turned/bathed. This is relieved with PRN meds.       Dyspnea:                [x] No [ ] Yes             [ ] Mild [ ] Moderate [ ] Severe    Anxiety:                  [x] No [ ] Yes             [ ] Mild [ ] Moderate [ ] Severe    Fatigue:                  [ ] No [x] Yes             [ ] Mild [ ] Moderate [ ] Severe    Nausea:                  [x] No [ ] Yes             [ ] Mild [ ] Moderate [ ] Severe    Loss of appetite:   [ ] No [x] Yes   (inferred)          [ ] Mild [ ] Moderate [ ] Severe    Constipation:        [ ] No [x] Yes             [ ] Mild [ ] Moderate [ ] Severe    Other Symptoms:  [ ] All other review of systems negative   [x] Unable to obtain due to poor mentation     Karnofsky Performance Score/Palliative Performance Status Version 2:       10 %    PHYSICAL EXAM:  Vital Signs Last 24 Hrs  T(C): 36.7 (10 Sep 2017 09:38), Max: 36.7 (10 Sep 2017 09:38)  T(F): 98 (10 Sep 2017 09:38), Max: 98 (10 Sep 2017 09:38)  HR: 83 (10 Sep 2017 09:38) (83 - 83)  BP: 115/56 (10 Sep 2017 09:38) (115/56 - 115/56)  BP(mean): --  RR: 16 (10 Sep 2017 09:38) (16 - 16)  SpO2: 100% (10 Sep 2017 09:38) (100% - 100%)	    General:  [ ] Alert  [ ] Oriented x      [x] Lethargic  [ ] Agitated   [ ] Cachexia   [ ] Unarousable  [ ] Verbal  [ ] Non-Verbal    HEENT:  [ ] Normal   [x] Dry mouth   [ ] ET Tube    [ ] Trach  [ ] Oral lesions    Lungs:   [x] Clear [ ] Tachypnea  [ ] Audible excessive secretions   [ ] Rhonchi        [ ] Right [ ] Left [ ] Bilateral  [ ] Crackles        [ ] Right [ ] Left [ ] Bilateral  [ ] Wheezing     [ ] Right [ ] Left [ ] Bilateral  [ ] Respiratory failure [ ] Acute [ ] Chronic [ ] Hypoxemic [ ] Hypercarbic [ ] Other    Cardiovascular:   [x] Regular [ ] Irregular [ ] Tachycardia   [ ] Bradycardia  [ ] Murmur [ ] Other  [ ] Shock [ ] Septic [ ] Hypovolemic [ ] Neurogenic [ ] Cardiogenic   [ ] Vasopressors [ ] Inotrophs    Abdomen:   [x] Soft  [ ] Distended   [ ] +BS  [x] Non tender [ ] Tender  [ ]PEG   [ ]OGT/ NGT   Last BM:     [ ] Other    Genitourinary:  [ ] Normal [x] Incontinent   [ ] Oliguria/Anuria   [ ] Jimenes  [ ] Other       Musculoskeletal:    [ ] Normal   [ ] Weakness  [ ] Edema  [x] Bedbound/Wheelchair bound [ ]  Ambulatory [ ] with [ ] without assistance [ ] Functional quadriplegia    Neurological: [ ] No focal deficits  [ ] Cognitive impairment  [ ] Dysphagia [ ] Dysarthria [ ] Paresis [ ] Other   [ ] Brain compression  [ ] Cerebral edema [x] Encephalopathy    Skin: Jaundiced    LABS: Reviewed- no blood draws done    Protein Calorie Malnutrition: [ ] Mild [ ] Moderate [ ] Severe    Oral Intake: [x] Unable/mouth care only [ ] Minimal [ ] Moderate [ ] Full Capability  Diet: [ ] NPO [ ] Tube feeds [ ] TPN [x] Other Mechanical soft    RADIOLOGY & ADDITIONAL STUDIES:    REFERRALS:   [ ] Chaplaincy  [ ] Hospice Care  [ ] Child Life  [ ] Social Work  [ ] Case management [ ] Nutrition [ ] Holistic Therapy [ ] Wound Care [ ]Physical Therapy [ ] Other [ ]See goals of care note in [place]